# Patient Record
Sex: FEMALE | Race: WHITE | NOT HISPANIC OR LATINO | Employment: UNEMPLOYED | URBAN - METROPOLITAN AREA
[De-identification: names, ages, dates, MRNs, and addresses within clinical notes are randomized per-mention and may not be internally consistent; named-entity substitution may affect disease eponyms.]

---

## 2018-04-17 ENCOUNTER — OFFICE VISIT CONVERTED (OUTPATIENT)
Dept: FAMILY MEDICINE CLINIC | Facility: CLINIC | Age: 23
End: 2018-04-17
Attending: NURSE PRACTITIONER

## 2018-05-17 ENCOUNTER — OFFICE VISIT CONVERTED (OUTPATIENT)
Dept: FAMILY MEDICINE CLINIC | Facility: CLINIC | Age: 23
End: 2018-05-17
Attending: NURSE PRACTITIONER

## 2018-08-29 ENCOUNTER — OFFICE VISIT CONVERTED (OUTPATIENT)
Dept: GASTROENTEROLOGY | Facility: CLINIC | Age: 23
End: 2018-08-29
Attending: PHYSICIAN ASSISTANT

## 2018-10-10 ENCOUNTER — OFFICE VISIT CONVERTED (OUTPATIENT)
Dept: FAMILY MEDICINE CLINIC | Facility: CLINIC | Age: 23
End: 2018-10-10
Attending: NURSE PRACTITIONER

## 2018-10-10 ENCOUNTER — CONVERSION ENCOUNTER (OUTPATIENT)
Dept: FAMILY MEDICINE CLINIC | Facility: CLINIC | Age: 23
End: 2018-10-10

## 2018-10-19 ENCOUNTER — OFFICE VISIT CONVERTED (OUTPATIENT)
Dept: FAMILY MEDICINE CLINIC | Facility: CLINIC | Age: 23
End: 2018-10-19
Attending: NURSE PRACTITIONER

## 2019-11-02 ENCOUNTER — HOSPITAL ENCOUNTER (OUTPATIENT)
Dept: URGENT CARE | Facility: CLINIC | Age: 24
Discharge: HOME | End: 2019-11-02
Attending: EMERGENCY MEDICINE

## 2019-12-14 ENCOUNTER — HOSPITAL ENCOUNTER (OUTPATIENT)
Dept: URGENT CARE | Facility: CLINIC | Age: 24
Discharge: HOME | End: 2019-12-14
Attending: PHYSICIAN ASSISTANT

## 2020-11-30 ENCOUNTER — HOSPITAL ENCOUNTER (OUTPATIENT)
Dept: URGENT CARE | Facility: CLINIC | Age: 25
Discharge: HOME | End: 2020-11-30

## 2020-12-02 LAB — BACTERIA SPEC AEROBE CULT: NORMAL

## 2020-12-03 LAB — SARS-COV-2 RNA SPEC QL NAA+PROBE: NOT DETECTED

## 2021-01-29 ENCOUNTER — OFFICE VISIT CONVERTED (OUTPATIENT)
Dept: FAMILY MEDICINE CLINIC | Facility: CLINIC | Age: 26
End: 2021-01-29
Attending: NURSE PRACTITIONER

## 2021-01-29 ENCOUNTER — CONVERSION ENCOUNTER (OUTPATIENT)
Dept: FAMILY MEDICINE CLINIC | Facility: CLINIC | Age: 26
End: 2021-01-29

## 2021-01-29 ENCOUNTER — HOSPITAL ENCOUNTER (OUTPATIENT)
Dept: FAMILY MEDICINE CLINIC | Facility: CLINIC | Age: 26
Discharge: HOME | End: 2021-01-29
Attending: NURSE PRACTITIONER

## 2021-01-29 LAB
FOLATE SERPL-MCNC: 7.1 NG/ML (ref 4.8–20)
VIT B12 SERPL-MCNC: 458 PG/ML (ref 211–911)

## 2021-01-30 LAB
ALBUMIN SERPL-MCNC: 4 G/DL (ref 3.5–5)
ALBUMIN/GLOB SERPL: 1.2 {RATIO} (ref 1.4–2.6)
ALP SERPL-CCNC: 58 U/L (ref 42–98)
ALT SERPL-CCNC: 11 U/L (ref 10–40)
ANION GAP SERPL CALC-SCNC: 16 MMOL/L (ref 8–19)
AST SERPL-CCNC: 23 U/L (ref 15–50)
BASOPHILS # BLD AUTO: 0.04 10*3/UL (ref 0–0.2)
BASOPHILS NFR BLD AUTO: 0.6 % (ref 0–3)
BILIRUB SERPL-MCNC: <0.15 MG/DL (ref 0.2–1.3)
BUN SERPL-MCNC: 17 MG/DL (ref 5–25)
BUN/CREAT SERPL: 24 {RATIO} (ref 6–20)
CALCIUM SERPL-MCNC: 8.8 MG/DL (ref 8.7–10.4)
CHLORIDE SERPL-SCNC: 104 MMOL/L (ref 99–111)
CHOLEST SERPL-MCNC: 136 MG/DL (ref 107–200)
CHOLEST/HDLC SERPL: 3.3 {RATIO} (ref 3–6)
CONV ABS IMM GRAN: 0.02 10*3/UL (ref 0–0.2)
CONV CO2: 22 MMOL/L (ref 22–32)
CONV IMMATURE GRAN: 0.3 % (ref 0–1.8)
CONV TOTAL PROTEIN: 7.3 G/DL (ref 6.3–8.2)
CREAT UR-MCNC: 0.7 MG/DL (ref 0.5–0.9)
DEPRECATED RDW RBC AUTO: 41.1 FL (ref 36.4–46.3)
EOSINOPHIL # BLD AUTO: 0.14 10*3/UL (ref 0–0.7)
EOSINOPHIL # BLD AUTO: 2 % (ref 0–7)
ERYTHROCYTE [DISTWIDTH] IN BLOOD BY AUTOMATED COUNT: 14.9 % (ref 11.7–14.4)
GFR SERPLBLD BASED ON 1.73 SQ M-ARVRAT: >60 ML/MIN/{1.73_M2}
GLOBULIN UR ELPH-MCNC: 3.3 G/DL (ref 2–3.5)
GLUCOSE SERPL-MCNC: 77 MG/DL (ref 65–99)
HCT VFR BLD AUTO: 37.9 % (ref 37–47)
HDLC SERPL-MCNC: 41 MG/DL (ref 40–60)
HGB BLD-MCNC: 11.3 G/DL (ref 12–16)
LDLC SERPL CALC-MCNC: 84 MG/DL (ref 70–100)
LYMPHOCYTES # BLD AUTO: 1.56 10*3/UL (ref 1–5)
LYMPHOCYTES NFR BLD AUTO: 22.3 % (ref 20–45)
MCH RBC QN AUTO: 23.1 PG (ref 27–31)
MCHC RBC AUTO-ENTMCNC: 29.8 G/DL (ref 33–37)
MCV RBC AUTO: 77.3 FL (ref 81–99)
MONOCYTES # BLD AUTO: 0.49 10*3/UL (ref 0.2–1.2)
MONOCYTES NFR BLD AUTO: 7 % (ref 3–10)
NEUTROPHILS # BLD AUTO: 4.75 10*3/UL (ref 2–8)
NEUTROPHILS NFR BLD AUTO: 67.8 % (ref 30–85)
NRBC CBCN: 0 % (ref 0–0.7)
OSMOLALITY SERPL CALC.SUM OF ELEC: 286 MOSM/KG (ref 273–304)
PLATELET # BLD AUTO: 275 10*3/UL (ref 130–400)
PMV BLD AUTO: 11.8 FL (ref 9.4–12.3)
POTASSIUM SERPL-SCNC: 4.4 MMOL/L (ref 3.5–5.3)
RBC # BLD AUTO: 4.9 10*6/UL (ref 4.2–5.4)
SODIUM SERPL-SCNC: 138 MMOL/L (ref 135–147)
TRIGL SERPL-MCNC: 57 MG/DL (ref 40–150)
TSH SERPL-ACNC: 3.33 M[IU]/L (ref 0.27–4.2)
VLDLC SERPL-MCNC: 11 MG/DL (ref 5–37)
WBC # BLD AUTO: 7 10*3/UL (ref 4.8–10.8)

## 2021-03-20 ENCOUNTER — HOSPITAL ENCOUNTER (OUTPATIENT)
Dept: URGENT CARE | Facility: CLINIC | Age: 26
Discharge: HOME | End: 2021-03-20
Attending: NURSE PRACTITIONER

## 2021-04-23 ENCOUNTER — HOSPITAL ENCOUNTER (OUTPATIENT)
Dept: SLEEP MEDICINE | Facility: HOSPITAL | Age: 26
Discharge: HOME | End: 2021-04-23
Attending: INTERNAL MEDICINE

## 2021-05-07 ENCOUNTER — HOSPITAL ENCOUNTER (OUTPATIENT)
Dept: SLEEP MEDICINE | Facility: HOSPITAL | Age: 26
Discharge: HOME | End: 2021-05-07
Attending: INTERNAL MEDICINE

## 2021-05-14 VITALS
OXYGEN SATURATION: 98 % | HEART RATE: 88 BPM | DIASTOLIC BLOOD PRESSURE: 91 MMHG | BODY MASS INDEX: 48.23 KG/M2 | SYSTOLIC BLOOD PRESSURE: 149 MMHG | HEIGHT: 64 IN | WEIGHT: 282.5 LBS | TEMPERATURE: 99.3 F | RESPIRATION RATE: 20 BRPM

## 2021-05-14 NOTE — PROGRESS NOTES
"   Progress Note      Patient Name: Nikki Patel   Patient ID: [id number]   Sex: Female   YOB: 1995    Referring Provider: Yonis JOSEPH    Visit Date: January 29, 2021    Provider: OPAL Griffiths   Location: [facility]   Location Address: [address] [place] [address]  [id number]   Location Phone: [phone]          Chief Complaint  · follow up anxiety, depression, palpitations, vitamin d deficiency, and allergies       History Of Present Illness  Nikki Patel is a 25 year old /White female who presents for evaluation and treatment of:      follow up anxiety, depression, palpitations,  vitamin d deficiency, and allergies   medication refills    c/o- bilateral ear pain and has felt \"off balance\" for almost 2 weeks  states that she is constantly nasally congested, has been for 2 years and gets scabs in her nostrils.   States that she sneezes frequently  States that she snores and thinks that it makes it worse.   States that she has tried allergy medication but it doesn't help.   no allergist     States that she wants to see about getting back on mirtazapine for depression, hydroxyzine for anxiety previously [facility] , left practice  and Metoprolol fro palpitations     pap- 10/30/2018    also pt c/o fatigue, snoring, not restful sleep, waking tired       Past Medical History  Disease Name Date Onset Notes   Abdominal Pain --  --    Anemia, Unspecified --  --    Anxiety --  --    Borderline personality disorder --  --    Chest pain --  --    Depression --  --    Irregular heart beat --  --          Past Surgical History  Procedure Name Date Notes   Cholecstectomy 2017 --    Colonoscopy 2018 --          Medication List  Name Date Started Instructions   hydroxyzine HCl 10 mg oral tablet  take 1 tablet by oral route As needed   metoprolol succinate 25 mg oral tablet extended release 24 hr 10/10/2018 " "TAKE 1 TABLET BY MOUTH EVERY DAY   mirtazapine 30 mg oral tablet 08/12/2019 TAKE 1 & 1/2 (ONE & ONE-HALF) TABLETS BY MOUTH ONCE DAILY   wrist brace metal with stay 04/17/2018 bilateral wrists dx wrist pain         Allergy List  Allergen Name Date Reaction Notes   NO KNOWN DRUG ALLERGIES --  --  --          Family Medical History  Disease Name Relative/Age Notes   Family history of colon cancer  Aunt         Social History  Finding Status Start/Stop Quantity Notes   Alcohol Current some day --/-- --  drinks weekly; liquor   Caffeine Current some day --/-- --  drinks occasionally; soft drinks; 1-2 times per day   Second hand smoke exposure Current some day --/-- --  yes   Tobacco Never --/-- --  never a smoker, smokes E-CIG with 0 nicotine         Immunizations  NameDate Admin Mfg Trade Name Lot Number Route Inj VIS Given VIS Publication   Qhlvujuvk09/10/2018 [facility] Fluzone Quadrivalent [id number] IM RD 10/10/2018 08/07/2015   Comments: pt tolerated well         Review of Systems  · Constitutional  o Admits  o : fatigue  o Denies  o : fever, chills  · Eyes  o Denies  o : discharge from eye  · HENT  o Admits  o : ear pain, nasal discharge, postnasal drainage, headaches, nasal congestion  o Denies  o : sore throat  · Cardiovascular  o Denies  o : chest Pain  · Respiratory  o Denies  o : frequent cough, shortness of breath  · Gastrointestinal  o Denies  o : nausea, vomiting, changes in bowel habits  · Genitourinary  o Denies  o : dysuria  · Neurologic  o Admits  o : headache, dizziness  · Psychiatric  o Admits  o : anxiety, depression, difficulty sleeping  o Denies  o : SI/HI  · Allergic-Immunologic  o Admits  o : seasonal allergies      Vitals  Date Time BP Position Site L\R Cuff Size HR RR TEMP (F) WT  HT  BMI kg/m2 BSA m2 O2 Sat FR L/min FiO2 HC       05/17/2018 09:36 /90 Sitting    95 - R  97.9 278lbs 0oz 5'  3\" 49.24 2.37 99 %      08/29/2018 02:35 /71 Sitting    89 - R   278lbs 0oz 5'  4\" 47.72 2.39 100 %    " "  10/10/2018 02:19 /74 Sitting    99 - R 19  283lbs 0oz 5'  4\" 48.58 2.41 98 %      10/19/2018 12:17 /79 Sitting    94 - R 16  283lbs 0oz 5'  4\" 48.58 2.41 9 %      01/29/2021 10:00 /91 Sitting    88 - R 20 99.3 282lbs 8oz 5'  4\" 48.49 2.41 98 %            Physical Examination  · Constitutional  o Appearance  o : well-nourished, in no acute distress  · Eyes  o Conjunctivae  o : conjunctivae injected   o Sclerae  o : sclerae white  o Pupils and Irises  o : pupils equal and round  o Eyelids/Ocular Adnexae  o : eyelid appearance normal, no exudates present  · Ears, Nose, Mouth and Throat  o Ears  o :   § External Ears  § : external auditory canal appearance within normal limits  § Otoscopic Examination  § : tympanic membrane appearance injected bilaterally  o Nose  o :   § External Nose  § : appearance normal  § Intranasal Exam  § : mucosa inflamed, sinuses nontender to palpation  § Nasopharynx  § : clear discharge present  o Throat  o :   § Oropharynx  § : no inflammation or lesions present, tonsils within normal limits  · Neck  o Inspection/Palpation  o : normal appearance, no masses or tenderness, trachea midline  o Thyroid  o : gland size normal, nontender  · Respiratory  o Respiratory Effort  o : breathing unlabored  o Auscultation of Lungs  o : normal breath sounds throughout inspiration and expiration  · Cardiovascular  o Heart  o :   § Auscultation of Heart  § : regular rate and rhythm, no murmur  o Peripheral Vascular System  o :   § Carotid Arteries  § : no bruits present  § Extremities  § : no clubbing or edema  · Gastrointestinal  o Abdominal Examination  o : abdomen nontender to palpation, bowel sounds present   · Lymphatic  o Neck  o : no lymphadenopathy present  · Skin and Subcutaneous Tissue  o General Inspection  o : pink, warm, dry  · Neurologic  o Mental Status Examination  o :   § Orientation  § : grossly oriented to person, place and time  o Gait and Station  o : normal gait, able " to stand without difficulty  · Psychiatric  o Judgement and Insight  o : judgment and insight intact  o Mood and Affect  o : mood normal, affect appropriate              Assessment  · Screening for depression     V79.0/Z13.89  · Allergic rhinitis due to allergen     477.9/J30.9  will start Flonase and allegra  · Anxiety disorder     300.00/F41.9  will restart hydroxyzine   · Depression     311/F32.9  will restart mirtazapine, recommend counseling   · Diarrhea     787.91/R19.7  · Fatigue     780.79/R53.83  · Insomnia, unspecified     780.52/G47.00  will refer to sleep center   · Vitamin D deficiency     268.9/E55.9  · Ear pain, bilateral     388.70/H92.03  · Head congestion     478.19/R09.81  · Sneezing     784.99/R06.7  · Palpitations     785.1/R00.2  will restart metoprolol   · Snoring     786.09/R06.83      Plan  · Orders  o ACO-18: Positive screen for clinical depression using a standardized tool and a follow-up plan documented () - V79.0/Z13.89 - 01/29/2021  o ALLERGY CONSULTATION (ALLEG) - 477.9/J30.9 - 01/29/2021  o B12 Folate levels (B12FO) - 780.79/R53.83 - 01/29/2021  o Physical, Primary Care Panel (CBC, CMP, Lipid, TSH) [facility] ([id number], [id number], [id number], [id number]) - - 01/29/2021  o ACO-39: Current medications updated and reviewed (, 1159F) - - 01/29/2021  o Sleep Disorder Clinic Consultation (SLEEP) - - 01/29/2021  · Medications  o fluticasone propionate 50 mcg/actuation nasal spray,suspension   SIG: inhale 2 sprays (100 mcg) in each nostril by intranasal route once daily for 90 days   DISP: (3) Bottle with 1 refills  Prescribed on 01/29/2021     o fexofenadine 180 mg oral tablet   SIG: take 1 tablet (180 mg) by oral route once daily for 90 days   DISP: (90) Tablet with 1 refills  Prescribed on 01/29/2021     o hydroxyzine HCl 25 mg oral tablet   SIG: take 1 tablet by oral route 4 times a day   DISP: (120) Tablet with 1 refills  Prescribed on 01/29/2021     o mirtazapine 30 mg oral tablet   SIG: TAKE 1 & 1/2  (ONE & ONE-HALF) TABLETS BY MOUTH ONCE DAILY   DISP: (135) Tablet with 1 refills  Adjusted on 01/29/2021     o metoprolol succinate 25 mg oral tablet extended release 24 hr   SIG: TAKE 1 TABLET BY MOUTH EVERY DAY   DISP: (90) Tablet with 1 refills  Refilled on 01/29/2021     o Medications have been Reconciled  o Transition of Care or Provider Policy  · Instructions  o Depression Screen completed and scanned into the EMR under the designated folder within the patient's documents.  o Today's PHQ-9 result is 17  o Patient was educated/instructed on their diagnosis, treatment and medications prior to discharge from the clinic today.  · Disposition  o will contact with diagnostics results  o schedule PAP            Electronically Signed by: OPAL Griffiths -Author on January 29, 2021 10:40:33 AM

## 2021-05-16 VITALS
RESPIRATION RATE: 19 BRPM | SYSTOLIC BLOOD PRESSURE: 139 MMHG | OXYGEN SATURATION: 98 % | HEART RATE: 99 BPM | HEIGHT: 64 IN | DIASTOLIC BLOOD PRESSURE: 74 MMHG | BODY MASS INDEX: 48.32 KG/M2 | WEIGHT: 283 LBS

## 2021-05-16 VITALS
WEIGHT: 278 LBS | HEIGHT: 64 IN | HEART RATE: 89 BPM | SYSTOLIC BLOOD PRESSURE: 138 MMHG | DIASTOLIC BLOOD PRESSURE: 71 MMHG | OXYGEN SATURATION: 100 % | BODY MASS INDEX: 47.46 KG/M2

## 2021-05-16 VITALS
DIASTOLIC BLOOD PRESSURE: 85 MMHG | HEIGHT: 63 IN | HEART RATE: 92 BPM | SYSTOLIC BLOOD PRESSURE: 135 MMHG | TEMPERATURE: 98.2 F | OXYGEN SATURATION: 99 % | WEIGHT: 290 LBS | BODY MASS INDEX: 51.38 KG/M2

## 2021-05-16 VITALS
RESPIRATION RATE: 16 BRPM | DIASTOLIC BLOOD PRESSURE: 79 MMHG | HEART RATE: 94 BPM | OXYGEN SATURATION: 9 % | SYSTOLIC BLOOD PRESSURE: 138 MMHG | BODY MASS INDEX: 48.32 KG/M2 | WEIGHT: 283 LBS | HEIGHT: 64 IN

## 2021-05-16 VITALS
BODY MASS INDEX: 49.26 KG/M2 | DIASTOLIC BLOOD PRESSURE: 90 MMHG | SYSTOLIC BLOOD PRESSURE: 130 MMHG | HEIGHT: 63 IN | WEIGHT: 278 LBS | TEMPERATURE: 97.9 F | HEART RATE: 95 BPM | OXYGEN SATURATION: 99 %

## 2021-06-03 NOTE — CONSULTS
Patient: NIKKI PATEL     Acct: [id number]     Report: #[id number]  MR #:  [id number]     DOS: 2021 1349     : 1995  DICTATING: Moni Yo  ***Signed***  --------------------------------------------------------------------------------------------------------------------                              [employer]                          [place], [place]  [address]           __________________________________________________________________________         Patient Name:                   Attending Physician:    Nikki Patel M.D.         Patient Visit # MR #            Admit Date  Disch Date     Location    [id number]    [id number]      2021                 SLEEP- -         Date of Birth    1995    __________________________________________________________________________    0814 - SLEEP OFFICE VISIT         DATE OF SERVICE:  2021         REFERRING PHYSICIAN:    OPAL Mccabe         CHIEF COMPLAINT:    Always feeling tired.         HISTORY OF PRESENT ILLNESS:    This is a 26-year-old female patient with a history of depression. She is    here today for complaints about excessive daytime sleepiness, no matter how    many hours of sleep she gets.         She usually goes to bed late, around 2 to 3 a.m. It takes her about 30    minutes to fall asleep. She wakes up at 11:45 a.m. She feels tired, groggy,    and very sleepy when she wakes up. She does wake up a few times at night to    urinate and awakens herself sometimes choking and coughing.         [place] Sleepiness Scale = 15. She denies sleep paralysis, hallucinations, or    cataplexy.         She reported loud snoring which sometimes awakens her, especially when she    falls asleep initially. Her fiancee complains about her loud snoring. She    wakes up with headache often and wakes up with dry  mouth and reported leg    jerks and very restless sleep. She was told that she talks in her sleep and    constantly flip flops throughout the night.         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         REVIEW OF SYSTEMS:    CONSTITUTIONAL: Fatigue. No change in appetite.    EMNT: She reported recurrent nasal bleeding and pain in her mouth and nasal    congestion.    CARDIOVASCULAR: She reported irregular heartbeats but denies chest pain or    swollen legs.    RESPIRATORY: She denies shortness of breath, cough, or wheezing.    GASTROINTESTINAL: She denies problems swallowing, heartburn, or diarrhea.    MUSCULOSKELETAL: She denies pain in her joints, stiffness or redness in her    joints.    ENDOCRINE: She denies cold or warm intolerance but reported excessive thirst.    URINARY: She denies dysuria or frequent urination.    NEURO/PSYCH: She reported dizziness, headache, anxiety and depression.    LYMPHATICS: She reported easy bruising but denies enlarged lymph nodes.    SKIN: She denies rash.         PAST MEDICAL HISTORY:    1.  Depression.    2.  Irregular heartbeats.         MEDICATIONS:    1.  Mirtazapine.    2.  Metoprolol.    3.  Fluticasone.    4.  Hydroxyzine.         PAST SURGICAL HISTORY:    Cholecystectomy in 2017.         ALLERGIES:    She has allergy to caviar.         SOCIAL HISTORY:    She vapes intermittently. She does not smoke. She drinks alcohol about 3    times a week. She rarely drinks caffeinated beverages such as tea. She denies    current illicit drug use.         FAMILY HISTORY:    Negative for sleep apnea.         PHYSICAL EXAMINATION:    VITAL SIGNS: Blood pressure 141/97, heart rate is 91, SpO2 97%, BMI 49,    weight is 287 pounds, height 5 feet 4 inches.    CONSTITUTIONAL: Not in any acute distress.    EYES: Injected conjunctivae. Extraocular  movements are intact. Pupils are    equal and reactive to light.    EMNT: Freidman's and Mallampati score of II and narrow distance in-between    the posterior pharyngeal pillars estimated to be less than 50% of the base of    the tongue. Tonsils are grade 2.    LUNGS: Clear to auscultation bilaterally. No crackles or wheezing. Nonlabored    breathing.    HEART: Regular rhythm and rate, no audible murmur.    ABDOMEN: Obese, soft, no tenderness. Positive bowel sounds.    musculoskeletal: No cyanosis, clubbing, or edema. Warm extremities, well    perfused.    NEURO/PSYCH: She is conscious, alert, and oriented. She has appropriate mood    and affect. Gait is normal.    INTEGUMENTARY: No skin rash or ecchymosis.    LYMPHATICS: No palpable cervical or supraclavicular lymph nodes.         ASSESSMENT:    1.  Snoring, probable obstructive sleep apnea.    2.  Hypersomnia, unspecified, likely secondary to sleep apnea.    3.  Morbid obesity (BMI = 49).    4.  Depression.         RECOMMENDATIONS:    1.  Check home sleep test. I explained the pathophysiology of sleep apnea        with the patient, testing, and therapy which may need CPAP and weight        loss.    2.  The patient is agreeable to CPAP therapy if needed.    3.  I counseled the patient for weight loss and encouraged her to exercise        regularly and cut down on carbohydrates. I explained that losing weight        may decrease the severity of sleep apnea and obviate the need of CPAP        therapy.    4.  I discussed with the patient the association between obstruction sleep        apnea and various comorbidities including most disorders such as        depression and the beneficial effect of sleep apnea therapy in reducing        the risk of cardiovascular disease and swings in mood.         To be electronically signed in [employer]    0988 TOMMY OCASIO M.D.         RJ:roberto    D:  04/23/2021 15:18    T:  04/27/2021 13:28    #[id number]         CC: SLEEP LAB          Until signed, this is an unconfirmed preliminary report that may contain    errors and is subject to change.         Electronically signed by Moni Yo  05/07/2021 08:43     Disclaimer: Converted hospital document may not contain table formatting or lab diagrams. Please see [employer] for authenticated document.

## 2021-12-14 ENCOUNTER — OFFICE VISIT (OUTPATIENT)
Dept: FAMILY MEDICINE CLINIC | Facility: CLINIC | Age: 26
End: 2021-12-14

## 2021-12-14 VITALS
HEIGHT: 64 IN | BODY MASS INDEX: 48.83 KG/M2 | HEART RATE: 100 BPM | TEMPERATURE: 98.5 F | SYSTOLIC BLOOD PRESSURE: 130 MMHG | OXYGEN SATURATION: 98 % | DIASTOLIC BLOOD PRESSURE: 86 MMHG | WEIGHT: 286 LBS

## 2021-12-14 DIAGNOSIS — Z13.29 SCREENING FOR THYROID DISORDER: ICD-10-CM

## 2021-12-14 DIAGNOSIS — D64.9 ANEMIA, UNSPECIFIED TYPE: ICD-10-CM

## 2021-12-14 DIAGNOSIS — R35.0 FREQUENT URINATION: ICD-10-CM

## 2021-12-14 DIAGNOSIS — R11.0 NAUSEA: ICD-10-CM

## 2021-12-14 DIAGNOSIS — Z13.220 SCREENING, LIPID: ICD-10-CM

## 2021-12-14 DIAGNOSIS — R73.09 ELEVATED GLUCOSE: ICD-10-CM

## 2021-12-14 DIAGNOSIS — Z3A.01 LESS THAN 8 WEEKS GESTATION OF PREGNANCY: Primary | ICD-10-CM

## 2021-12-14 PROBLEM — F32.A DEPRESSION: Status: ACTIVE | Noted: 2021-12-14

## 2021-12-14 PROBLEM — I49.9 IRREGULAR HEART BEAT: Status: ACTIVE | Noted: 2021-12-14

## 2021-12-14 PROBLEM — F41.9 ANXIETY: Status: ACTIVE | Noted: 2021-12-14

## 2021-12-14 PROBLEM — F60.3 BORDERLINE PERSONALITY DISORDER: Status: ACTIVE | Noted: 2021-12-14

## 2021-12-14 LAB
ACANTHOCYTES BLD QL SMEAR: ABNORMAL
ALBUMIN SERPL-MCNC: 4.5 G/DL (ref 3.5–5.2)
ALBUMIN/GLOB SERPL: 1.2 G/DL
ALP SERPL-CCNC: 53 U/L (ref 39–117)
ALT SERPL W P-5'-P-CCNC: 12 U/L (ref 1–33)
ANION GAP SERPL CALCULATED.3IONS-SCNC: 12.4 MMOL/L (ref 5–15)
ANISOCYTOSIS BLD QL: ABNORMAL
AST SERPL-CCNC: 16 U/L (ref 1–32)
BILIRUB SERPL-MCNC: 0.2 MG/DL (ref 0–1.2)
BUN SERPL-MCNC: 8 MG/DL (ref 6–20)
BUN/CREAT SERPL: 13.6 (ref 7–25)
CALCIUM SPEC-SCNC: 9.9 MG/DL (ref 8.6–10.5)
CHLORIDE SERPL-SCNC: 102 MMOL/L (ref 98–107)
CHOLEST SERPL-MCNC: 143 MG/DL (ref 0–200)
CO2 SERPL-SCNC: 21.6 MMOL/L (ref 22–29)
CREAT SERPL-MCNC: 0.59 MG/DL (ref 0.57–1)
DEPRECATED RDW RBC AUTO: 40.7 FL (ref 37–54)
EOSINOPHIL # BLD MANUAL: 0.08 10*3/MM3 (ref 0–0.4)
EOSINOPHIL NFR BLD MANUAL: 1 % (ref 0.3–6.2)
ERYTHROCYTE [DISTWIDTH] IN BLOOD BY AUTOMATED COUNT: 15.6 % (ref 12.3–15.4)
FERRITIN SERPL-MCNC: 28.9 NG/ML (ref 13–150)
GFR SERPL CREATININE-BSD FRML MDRD: 124 ML/MIN/1.73
GLOBULIN UR ELPH-MCNC: 3.7 GM/DL
GLUCOSE SERPL-MCNC: 105 MG/DL (ref 65–99)
HCT VFR BLD AUTO: 38.5 % (ref 34–46.6)
HDLC SERPL-MCNC: 36 MG/DL (ref 40–60)
HGB BLD-MCNC: 12.4 G/DL (ref 12–15.9)
IRON 24H UR-MRATE: 22 MCG/DL (ref 37–145)
IRON SATN MFR SERPL: 5 % (ref 20–50)
LDLC SERPL CALC-MCNC: 90 MG/DL (ref 0–100)
LDLC/HDLC SERPL: 2.49 {RATIO}
LYMPHOCYTES # BLD MANUAL: 1.37 10*3/MM3 (ref 0.7–3.1)
LYMPHOCYTES NFR BLD MANUAL: 5.2 % (ref 5–12)
MCH RBC QN AUTO: 23.6 PG (ref 26.6–33)
MCHC RBC AUTO-ENTMCNC: 32.2 G/DL (ref 31.5–35.7)
MCV RBC AUTO: 73.3 FL (ref 79–97)
MICROCYTES BLD QL: ABNORMAL
MONOCYTES # BLD: 0.43 10*3/MM3 (ref 0.1–0.9)
NEUTROPHILS # BLD AUTO: 6.42 10*3/MM3 (ref 1.7–7)
NEUTROPHILS NFR BLD MANUAL: 77.3 % (ref 42.7–76)
NRBC BLD AUTO-RTO: 0 /100 WBC (ref 0–0.2)
PLAT MORPH BLD: NORMAL
PLATELET # BLD AUTO: 292 10*3/MM3 (ref 140–450)
PMV BLD AUTO: 11.4 FL (ref 6–12)
POIKILOCYTOSIS BLD QL SMEAR: ABNORMAL
POLYCHROMASIA BLD QL SMEAR: ABNORMAL
POTASSIUM SERPL-SCNC: 3.9 MMOL/L (ref 3.5–5.2)
PROT SERPL-MCNC: 8.2 G/DL (ref 6–8.5)
RBC # BLD AUTO: 5.25 10*6/MM3 (ref 3.77–5.28)
SODIUM SERPL-SCNC: 136 MMOL/L (ref 136–145)
TIBC SERPL-MCNC: 453 MCG/DL (ref 298–536)
TRANSFERRIN SERPL-MCNC: 304 MG/DL (ref 200–360)
TRIGL SERPL-MCNC: 87 MG/DL (ref 0–150)
TSH SERPL DL<=0.05 MIU/L-ACNC: 1.86 UIU/ML (ref 0.27–4.2)
VARIANT LYMPHS NFR BLD MANUAL: 16.5 % (ref 19.6–45.3)
VLDLC SERPL-MCNC: 17 MG/DL (ref 5–40)
WBC MORPH BLD: NORMAL
WBC NRBC COR # BLD: 8.3 10*3/MM3 (ref 3.4–10.8)

## 2021-12-14 PROCEDURE — 99213 OFFICE O/P EST LOW 20 MIN: CPT | Performed by: NURSE PRACTITIONER

## 2021-12-14 PROCEDURE — 83036 HEMOGLOBIN GLYCOSYLATED A1C: CPT | Performed by: NURSE PRACTITIONER

## 2021-12-14 PROCEDURE — 81003 URINALYSIS AUTO W/O SCOPE: CPT | Performed by: NURSE PRACTITIONER

## 2021-12-14 PROCEDURE — 82728 ASSAY OF FERRITIN: CPT | Performed by: NURSE PRACTITIONER

## 2021-12-14 PROCEDURE — 85025 COMPLETE CBC W/AUTO DIFF WBC: CPT | Performed by: NURSE PRACTITIONER

## 2021-12-14 PROCEDURE — 83540 ASSAY OF IRON: CPT | Performed by: NURSE PRACTITIONER

## 2021-12-14 PROCEDURE — 80053 COMPREHEN METABOLIC PANEL: CPT | Performed by: NURSE PRACTITIONER

## 2021-12-14 PROCEDURE — 84702 CHORIONIC GONADOTROPIN TEST: CPT | Performed by: NURSE PRACTITIONER

## 2021-12-14 PROCEDURE — 80061 LIPID PANEL: CPT | Performed by: NURSE PRACTITIONER

## 2021-12-14 PROCEDURE — 84466 ASSAY OF TRANSFERRIN: CPT | Performed by: NURSE PRACTITIONER

## 2021-12-14 PROCEDURE — 84443 ASSAY THYROID STIM HORMONE: CPT | Performed by: NURSE PRACTITIONER

## 2021-12-14 PROCEDURE — 85007 BL SMEAR W/DIFF WBC COUNT: CPT | Performed by: NURSE PRACTITIONER

## 2021-12-14 RX ORDER — DOXYLAMINE SUCCINATE AND PYRIDOXINE HYDROCHLORIDE, DELAYED RELEASE TABLETS 10 MG/10 MG 10; 10 MG/1; MG/1
2 TABLET, DELAYED RELEASE ORAL NIGHTLY PRN
Qty: 60 TABLET | Refills: 2 | Status: SHIPPED | OUTPATIENT
Start: 2021-12-14 | End: 2022-01-06

## 2021-12-14 NOTE — PROGRESS NOTES
Follow Up Office Visit      Patient Name: Nikki Patel  : 1995   MRN: [id number]     Chief Complaint:    Chief Complaint   Patient presents with   • Hyperemesis Gravidarum   • Allergic Rhinitis   • Anemia       History of Present Illness: Nikki Patel is a 26 y.o. female who is here today for nausea  Follow up anemia, seasonal allergies, sleep apnea  Patient reports she was diagnosed with sleep apnea earlier this year but lost her insurance has not obtain CPAP machine at this time  Patient reports she has had some sinus drainage but using OTC Benadryl  C/O-pt is 7 weeks pregnant per patient's calculations  LMP 11.    [facility]  Subjective      Review of Systems:   Review of Systems   Constitutional: Negative for chills and fever.   Respiratory: Negative for cough.    Cardiovascular: Negative for chest pain.   Gastrointestinal: Positive for nausea. Negative for abdominal pain, constipation, diarrhea and vomiting.   Genitourinary: Positive for urgency. Negative for dysuria.   Allergic/Immunologic: Positive for environmental allergies.   Neurological: Negative for headaches.        Past Medical History: History reviewed. No pertinent past medical history.    Past Surgical History: No past surgical history on file.    Family History: No family history on file.    Social History:   Social History     Socioeconomic History   • Marital status: Single   Tobacco Use   • Smoking status: Current Every Day Smoker     Packs/day: 0.10     Types: Cigarettes   • Smokeless tobacco: Never Used       Medications:     Current Outpatient Medications:   •  doxylamine-pyridoxine (Diclegis) 10-10 MG tablet delayed-release EC tablet, Take 2 tablets by mouth At Night As Needed (nausea)., Disp: 60 tablet, Rfl: 2    Allergies:   Not on File      Objective     Physical Exam:  Vital Signs:   Vitals:    21 1433   BP: 130/86   Pulse: 100   Temp: 98.5 °F (36.9 °C)   SpO2: 98%  "  Weight: 130 kg (286 lb)   Height: 162.6 cm (64\")     Body mass index is 49.09 kg/m².     Physical Exam  HENT:      Right Ear: Tympanic membrane normal.      Left Ear: Tympanic membrane normal.      Nose: Nose normal.      Mouth/Throat:      Mouth: Mucous membranes are moist.   Eyes:      Conjunctiva/sclera: Conjunctivae normal.      Pupils: Pupils are equal, round, and reactive to light.   Neck:      Vascular: No carotid bruit.   Cardiovascular:      Rate and Rhythm: Normal rate and regular rhythm.      Heart sounds: Normal heart sounds. No murmur heard.      Pulmonary:      Effort: Pulmonary effort is normal.      Breath sounds: Normal breath sounds.   Abdominal:      General: Bowel sounds are normal.      Palpations: Abdomen is soft.   Musculoskeletal:      Right lower leg: No edema.      Left lower leg: No edema.   Skin:     General: Skin is warm and dry.   Neurological:      Mental Status: She is alert.   Psychiatric:         Mood and Affect: Mood normal.         Behavior: Behavior normal.             Assessment / Plan      Assessment/Plan:   Diagnoses and all orders for this visit:    1. Less than 8 weeks gestation of pregnancy (Primary)  -     hCG, Quantitative, Pregnancy; Future  -     hCG, Quantitative, Pregnancy    2. Nausea  -     Comprehensive Metabolic Panel    3. Anemia, unspecified type  -     Iron Profile  -     Ferritin  -     CBC Auto Differential    4. Screening, lipid  -     Lipid Panel    5. Screening for thyroid disorder  -     TSH    6. Frequent urination  -     Urinalysis With Culture If Indicated - Urine, Clean Catch    Other orders  -     doxylamine-pyridoxine (Diclegis) 10-10 MG tablet delayed-release EC tablet; Take 2 tablets by mouth At Night As Needed (nausea).  Dispense: 60 tablet; Refill: 2       Pregnancy positive for urine test at home will obtain hCG in office  Nausea will provide a prescription for Diclegis notify her OB at next appointment  Anemia will obtain iron profile and " CBC to monitor  Will obtain TSH screening for thyroid disorder  We will obtain lipid panel to screen for lipid disorder  Frequent urination will obtain urine analysis culture if needed        Follow Up:   Return if symptoms worsen or fail to improve.    Jaylon Connors, OPAL      Please note that portions of this note were completed with a voice recognition program.

## 2021-12-15 ENCOUNTER — TELEPHONE (OUTPATIENT)
Dept: FAMILY MEDICINE CLINIC | Facility: CLINIC | Age: 26
End: 2021-12-15

## 2021-12-15 DIAGNOSIS — R73.09 ELEVATED GLUCOSE: Primary | ICD-10-CM

## 2021-12-15 LAB
BILIRUB UR QL STRIP: NEGATIVE
CLARITY UR: CLEAR
COLOR UR: YELLOW
GLUCOSE UR STRIP-MCNC: NEGATIVE MG/DL
HBA1C MFR BLD: 5.35 % (ref 4.8–5.6)
HCG INTACT+B SERPL-ACNC: NORMAL MIU/ML
HGB UR QL STRIP.AUTO: NEGATIVE
KETONES UR QL STRIP: NEGATIVE
LEUKOCYTE ESTERASE UR QL STRIP.AUTO: NEGATIVE
NITRITE UR QL STRIP: NEGATIVE
PH UR STRIP.AUTO: 6 [PH] (ref 5–8)
PROT UR QL STRIP: NEGATIVE
SP GR UR STRIP: 1.01 (ref 1–1.03)
UROBILINOGEN UR QL STRIP: NORMAL

## 2021-12-15 NOTE — TELEPHONE ENCOUNTER
Caller: Nikki Patel    Relationship: Self    Best call back number: [phone]    Caller requesting test results: PATIENT    What test was performed: LABS AND URINE TEST    When was the test performed: 12/14/21    Where was the test performed: IN OFFICE    Additional notes: PATIENT WOULD LIKE TO GO OVER RESULTS OF LABS AND URINE TEST.

## 2021-12-15 NOTE — TELEPHONE ENCOUNTER
Caller: Nikki Patel    Relationship: Self    Best call back number: [phone]    What test/procedure requested: PRIOR AUTHORIZATION    When is it needed: ASAP    Additional information or concerns: THE NAUSEA MEDICATION THAT SHE WAS PRESCRIBED YESTERDAY REQUIRES A PRIOR AUTHORIZATION. SHE IS NOT SURE WHAT THE NAME OF IT IS.    [address] [phone] [facility] [phone] FX

## 2021-12-15 NOTE — TELEPHONE ENCOUNTER
Patient is going to call the pharmacy and ask how much the medication costs over the counter. And if not she'll contact ob/gyn.

## 2021-12-20 ENCOUNTER — APPOINTMENT (OUTPATIENT)
Dept: ULTRASOUND IMAGING | Facility: HOSPITAL | Age: 26
End: 2021-12-20

## 2021-12-20 ENCOUNTER — HOSPITAL ENCOUNTER (EMERGENCY)
Facility: HOSPITAL | Age: 26
Discharge: HOME | End: 2021-12-20
Attending: EMERGENCY MEDICINE | Admitting: EMERGENCY MEDICINE

## 2021-12-20 VITALS
BODY MASS INDEX: 45.84 KG/M2 | RESPIRATION RATE: 17 BRPM | TEMPERATURE: 97.6 F | WEIGHT: 268.52 LBS | OXYGEN SATURATION: 97 % | SYSTOLIC BLOOD PRESSURE: 131 MMHG | HEART RATE: 101 BPM | DIASTOLIC BLOOD PRESSURE: 76 MMHG | HEIGHT: 64 IN

## 2021-12-20 DIAGNOSIS — O20.0 THREATENED MISCARRIAGE: Primary | ICD-10-CM

## 2021-12-20 DIAGNOSIS — B96.89 BACTERIAL VAGINITIS: ICD-10-CM

## 2021-12-20 DIAGNOSIS — N76.0 BACTERIAL VAGINITIS: ICD-10-CM

## 2021-12-20 LAB
ABO GROUP BLD: NORMAL
BASOPHILS # BLD AUTO: 0.03 10*3/MM3 (ref 0–0.2)
BASOPHILS NFR BLD AUTO: 0.4 % (ref 0–1.5)
C TRACH RRNA CVX QL NAA+PROBE: NOT DETECTED
CANDIDA SPECIES: NEGATIVE
DEPRECATED RDW RBC AUTO: 43.1 FL (ref 37–54)
EOSINOPHIL # BLD AUTO: 0.17 10*3/MM3 (ref 0–0.4)
EOSINOPHIL NFR BLD AUTO: 2.1 % (ref 0.3–6.2)
ERYTHROCYTE [DISTWIDTH] IN BLOOD BY AUTOMATED COUNT: 16.1 % (ref 12.3–15.4)
GARDNERELLA VAGINALIS: POSITIVE
HCG INTACT+B SERPL-ACNC: NORMAL MIU/ML
HCT VFR BLD AUTO: 38.5 % (ref 34–46.6)
HGB BLD-MCNC: 12.5 G/DL (ref 12–15.9)
HOLD SPECIMEN: NORMAL
HOLD SPECIMEN: NORMAL
IMM GRANULOCYTES # BLD AUTO: 0.02 10*3/MM3 (ref 0–0.05)
IMM GRANULOCYTES NFR BLD AUTO: 0.2 % (ref 0–0.5)
LYMPHOCYTES # BLD AUTO: 2.26 10*3/MM3 (ref 0.7–3.1)
LYMPHOCYTES NFR BLD AUTO: 27.5 % (ref 19.6–45.3)
MCH RBC QN AUTO: 24 PG (ref 26.6–33)
MCHC RBC AUTO-ENTMCNC: 32.5 G/DL (ref 31.5–35.7)
MCV RBC AUTO: 74 FL (ref 79–97)
MONOCYTES # BLD AUTO: 0.38 10*3/MM3 (ref 0.1–0.9)
MONOCYTES NFR BLD AUTO: 4.6 % (ref 5–12)
N GONORRHOEA RRNA SPEC QL NAA+PROBE: NOT DETECTED
NEUTROPHILS NFR BLD AUTO: 5.36 10*3/MM3 (ref 1.7–7)
NEUTROPHILS NFR BLD AUTO: 65.2 % (ref 42.7–76)
NRBC BLD AUTO-RTO: 0 /100 WBC (ref 0–0.2)
PLATELET # BLD AUTO: 265 10*3/MM3 (ref 140–450)
PMV BLD AUTO: 11.1 FL (ref 6–12)
RBC # BLD AUTO: 5.2 10*6/MM3 (ref 3.77–5.28)
RH BLD: POSITIVE
T VAGINALIS DNA VAG QL PROBE+SIG AMP: NEGATIVE
WBC NRBC COR # BLD: 8.22 10*3/MM3 (ref 3.4–10.8)
WHOLE BLOOD HOLD SPECIMEN: NORMAL
WHOLE BLOOD HOLD SPECIMEN: NORMAL

## 2021-12-20 PROCEDURE — 87491 CHLMYD TRACH DNA AMP PROBE: CPT | Performed by: PHYSICIAN ASSISTANT

## 2021-12-20 PROCEDURE — 87480 CANDIDA DNA DIR PROBE: CPT | Performed by: PHYSICIAN ASSISTANT

## 2021-12-20 PROCEDURE — 86901 BLOOD TYPING SEROLOGIC RH(D): CPT | Performed by: PHYSICIAN ASSISTANT

## 2021-12-20 PROCEDURE — 87660 TRICHOMONAS VAGIN DIR PROBE: CPT | Performed by: PHYSICIAN ASSISTANT

## 2021-12-20 PROCEDURE — 76817 TRANSVAGINAL US OBSTETRIC: CPT

## 2021-12-20 PROCEDURE — 85025 COMPLETE CBC W/AUTO DIFF WBC: CPT | Performed by: EMERGENCY MEDICINE

## 2021-12-20 PROCEDURE — 99284 EMERGENCY DEPT VISIT MOD MDM: CPT

## 2021-12-20 PROCEDURE — 84702 CHORIONIC GONADOTROPIN TEST: CPT | Performed by: EMERGENCY MEDICINE

## 2021-12-20 PROCEDURE — 87510 GARDNER VAG DNA DIR PROBE: CPT | Performed by: PHYSICIAN ASSISTANT

## 2021-12-20 PROCEDURE — 87591 N.GONORRHOEAE DNA AMP PROB: CPT | Performed by: PHYSICIAN ASSISTANT

## 2021-12-20 PROCEDURE — 86900 BLOOD TYPING SEROLOGIC ABO: CPT | Performed by: PHYSICIAN ASSISTANT

## 2021-12-20 RX ORDER — SODIUM CHLORIDE 0.9 % (FLUSH) 0.9 %
10 SYRINGE (ML) INJECTION AS NEEDED
Status: DISCONTINUED | OUTPATIENT
Start: 2021-12-20 | End: 2021-12-20 | Stop reason: HOSPADM

## 2021-12-20 RX ORDER — METRONIDAZOLE 500 MG/1
500 TABLET ORAL 2 TIMES DAILY
Qty: 14 TABLET | Refills: 0 | Status: SHIPPED | OUTPATIENT
Start: 2021-12-20 | End: 2022-01-06

## 2021-12-20 NOTE — ED PROVIDER NOTES
Subjective   Pt states she is 8 weeks pregnant. Has not seen OB yet, apt tomorrow at [facility].  Started to have spotting yesterday and has progressed and now heavier bleeding. No clots. This is her 1st pregnancy.       History provided by:  Patient  Vaginal Bleeding - Pregnant  Quality:  Bright red  Severity:  Moderate  Onset quality:  Gradual  Duration:  1 day  Timing:  Intermittent  Progression:  Worsening  Chronicity:  New  Prior pregnancy: no    Pregnancy confirmed by ultrasound: no    Prenatal care:  Regular prenatal care  Associated symptoms: no fatigue and no fever        Review of Systems   Constitutional: Negative for appetite change, chills, fatigue and fever.   HENT: Negative.    Eyes: Negative.  Negative for photophobia.   Respiratory: Negative.    Gastrointestinal: Negative.    Endocrine: Negative.    Genitourinary: Positive for vaginal bleeding.   Musculoskeletal: Negative.    Skin: Negative.    Allergic/Immunologic: Negative.  Negative for immunocompromised state.   Neurological: Negative.    Hematological: Negative.    Psychiatric/Behavioral: Negative.    All other systems reviewed and are negative.      History reviewed. No pertinent past medical history.    No Known Allergies    History reviewed. No pertinent surgical history.    History reviewed. No pertinent family history.    Social History     Socioeconomic History   • Marital status: Single   Tobacco Use   • Smoking status: Current Every Day Smoker     Packs/day: 0.10     Types: Cigarettes   • Smokeless tobacco: Never Used           Objective   Physical Exam  Vitals and nursing note reviewed. Exam conducted with a chaperone present.   Constitutional:       General: She is not in acute distress.     Appearance: Normal appearance. She is normal weight. She is not ill-appearing or toxic-appearing.   HENT:      Head: Normocephalic and atraumatic.   Cardiovascular:      Rate and Rhythm: Normal rate and regular rhythm.      Heart sounds: Normal heart  sounds.   Pulmonary:      Effort: Pulmonary effort is normal.      Breath sounds: Normal breath sounds.   Abdominal:      General: Abdomen is flat. Bowel sounds are normal.      Palpations: Abdomen is soft.   Genitourinary:     Vagina: Normal.      Cervix: Cervical bleeding (mild bright red) present.      Adnexa: Right adnexa normal and left adnexa normal.        Right: No mass, tenderness or fullness.          Left: No mass, tenderness or fullness.     Musculoskeletal:         General: Normal range of motion.      Cervical back: Normal range of motion.   Neurological:      Mental Status: She is alert and oriented to person, place, and time. Mental status is at baseline.   Psychiatric:         Mood and Affect: Mood normal.         Behavior: Behavior normal.         Thought Content: Thought content normal.         Judgment: Judgment normal.             MDM  Number of Diagnoses or Management Options  Bacterial vaginitis  Threatened miscarriage  Diagnosis management comments: Pt is a 26 y.o. female that presents today with Patient presents with:  Vaginal Bleeding - Pregnant: approx 8 weeks       Work up today:  Lab Results (last 24 hours)     Procedure Component Value Units Date/Time    CBC & Differential ([id number])  (Abnormal) Collected: 12/20/21 0822    Specimen: Blood Updated: 12/20/21 0911    Narrative:      The following orders were created for panel order CBC & Differential.  Procedure                               Abnormality         Status                       ---------                               -----------         ------                       CBC Auto Differential([id number])        Abnormal            Final result                   Please view results for these tests on the individual orders.    hCG, Quantitative, Pregnancy ([id number]) Collected: 12/20/21 0822    Specimen: Blood Updated: 12/20/21 0905     HCG Quantitative 38,966.00 mIU/mL     Narrative:      HCG Ranges by Gestational Age    Females -  non-pregnant premenopausal   </= 1mIU/mL HCG  Females - postmenopausal               </= 7mIU/mL HCG    3 Weeks       5.4   -      72 mIU/mL  4 Weeks      10.2   -     708 mIU/mL  5 Weeks       217   -   8,245 mIU/mL  6 Weeks       152   -  32,177 mIU/mL  7 Weeks     4,059   - 153,767 mIU/mL  8 Weeks    31,366   - 149,094 mIU/mL  9 Weeks    59,109   - 135,901 mIU/mL  10 Weeks   44,186   - 170,409 mIU/mL  12 Weeks   27,107   - 201,615 mIU/mL  14 Weeks   24,302   -  93,646 mIU/mL  15 Weeks   12,540   -  69,747 mIU/mL  16 Weeks    8,904   -  55,332 mIU/mL  17 Weeks    8,240   -  51,793 mIU/mL  18 Weeks    9,649   -  55,271 mIU/mL    Results may be falsely decreased if patient taking Biotin.      CBC Auto Differential ([id number])  (Abnormal) Collected: 12/20/21 0822    Specimen: Blood Updated: 12/20/21 0911     WBC 8.22 10*3/mm3      RBC 5.20 10*6/mm3      Hemoglobin 12.5 g/dL      Hematocrit 38.5 %      MCV 74.0 fL      MCH 24.0 pg      MCHC 32.5 g/dL      RDW 16.1 %      RDW-SD 43.1 fl      MPV 11.1 fL      Platelets 265 10*3/mm3      Neutrophil % 65.2 %      Lymphocyte % 27.5 %      Monocyte % 4.6 %      Eosinophil % 2.1 %      Basophil % 0.4 %      Immature Grans % 0.2 %      Neutrophils, Absolute 5.36 10*3/mm3      Lymphocytes, Absolute 2.26 10*3/mm3      Monocytes, Absolute 0.38 10*3/mm3      Eosinophils, Absolute 0.17 10*3/mm3      Basophils, Absolute 0.03 10*3/mm3      Immature Grans, Absolute 0.02 10*3/mm3      nRBC 0.0 /100 WBC     Chlamydia trachomatis, Neisseria gonorrhoeae, PCR - Swab, Cervix   ([id number])  (Normal) Collected: 12/20/21 0853    Specimen: Swab from Cervix Updated: 12/20/21 1043     Chlamydia DNA by PCR Not Detected     Neisseria gonorrhoeae by PCR Not Detected    Gardnerella vaginalis, Trichomonas vaginalis, Candida albicans, DNA -   Swab, Vagina ([id number])  (Abnormal) Collected: 12/20/21 0854    Specimen: Swab from Vagina Updated: 12/20/21 0957     GARDNERELLA VAGINALIS Positive      TRICHOMONAS VAGINALIS Negative     CANDIDA SPECIES Negative      US Ob Transvaginal    Result Date: 12/20/2021  PROCEDURE: US OB TRANSVAGINAL  COMPARISON: None  INDICATIONS: bleeding  TECHNIQUE: Ultrasound examination of the pelvis was performed, using endovaginal technique.   FINDINGS:  The uterus measures 9.4 x 5.2 x 5.2 cm and contains a gestational sac within the endometrial cavity with mean sac diameter 19.0 mm.  There is a yolk sac measuring 3.2 mm.  There is a fetal pole with crown-rump length 13.5 mm.  The fetal heart rate is 167 beats per minute.  A nabothian cyst is noted within the cervix.  There is also a 1.7 cm subchorionic hemorrhage.  The right ovary was not well visualized.  The left ovary measures 3.3 x 2.5 x 2.3 cm and contains a 1.7 cm cyst.  Normal flow is seen within the left ovary.  CONCLUSION:  1. Single live intrauterine gestation.  Ultrasound gestational age corresponds to 7 weeks 2 days, for expected date of delivery 8/6/2022. 2. 1.7 cm subchorionic hemorrhage. 3. 1.7 cm left adnexal cyst, which could be functional or physiologic.     CELESTE TAYLOR MD       Electronically Signed and Approved By: CELESTE TAYLOR MD on 12/20/2021 at 10:47              @No orders to display       The patient will pursue further outpatient evaluation with the primary care physician or other designated or consulting physician as outlined in the discharge instructions. They are agreeable to this plan of care and follow-up instructions have been explained in detail. The patient has received these instructions in written format and have expressed an understanding of the discharge instructions. The patient is aware that any significant change in condition or worsening of symptoms should prompt an immediate return to this or the closest emergency department or call to 911.  Pt is otherwise non toxic and non ill appearing and stable for d/c home.  Pt is in agreement with this.  All questions answered at bedside.           Amount and/or Complexity of Data Reviewed  Clinical lab tests: reviewed  Tests in the radiology section of CPT®: reviewed    Risk of Complications, Morbidity, and/or Mortality  Presenting problems: moderate  Diagnostic procedures: moderate  Management options: moderate    Patient Progress  Patient progress: stable      Final diagnoses:   Threatened miscarriage   Bacterial vaginitis       ED Disposition  ED Disposition     ED Disposition Condition Comment    Discharge Stable           keep OB apt for tomorrow               Medication List      New Prescriptions    metroNIDAZOLE 500 MG tablet  Commonly known as: FLAGYL  Take 1 tablet by mouth 2 (Two) Times a Day.           Where to Get Your Medications      These medications were sent to [facility] [address] [place], [place] [address] [facility]  [phone] [facility] [phone] [address] [place] [address]    Phone: [phone]   · metroNIDAZOLE 500 MG tablet          Gallo Kelley PA  12/20/21 8871

## 2021-12-20 NOTE — ED NOTES
Patient states that she had pink show yesterday, scant amount when wiping after urination, Patient states that today she went to the bathroom this AM and had small amount of blood on toilet paper and small amount of blood in toilet. Patient states that she has abdominal cramping intermittent, patient states that she also has back pain, patient states that nothing helps relieve the back pain unless she's laying down, patient states that the pain is 8/10 with movement. Patient denies pain with intercourse or any predisposition before the bleeding occurred, Patient states her first OB appointment is tomorrow.      Marya Cummings, RN  12/20/21 1259

## 2022-01-06 PROCEDURE — U0004 COV-19 TEST NON-CDC HGH THRU: HCPCS | Performed by: NURSE PRACTITIONER

## 2022-02-01 PROCEDURE — 87086 URINE CULTURE/COLONY COUNT: CPT | Performed by: FAMILY MEDICINE

## 2022-02-02 ENCOUNTER — APPOINTMENT (OUTPATIENT)
Dept: MRI IMAGING | Facility: HOSPITAL | Age: 27
End: 2022-02-02

## 2022-02-02 ENCOUNTER — HOSPITAL ENCOUNTER (EMERGENCY)
Facility: HOSPITAL | Age: 27
Discharge: HOME | End: 2022-02-02
Attending: EMERGENCY MEDICINE | Admitting: EMERGENCY MEDICINE

## 2022-02-02 VITALS
OXYGEN SATURATION: 100 % | HEIGHT: 64 IN | BODY MASS INDEX: 45.13 KG/M2 | RESPIRATION RATE: 16 BRPM | TEMPERATURE: 97.9 F | HEART RATE: 73 BPM | WEIGHT: 264.33 LBS | DIASTOLIC BLOOD PRESSURE: 83 MMHG | SYSTOLIC BLOOD PRESSURE: 131 MMHG

## 2022-02-02 DIAGNOSIS — G43.009 ATYPICAL MIGRAINE: Primary | ICD-10-CM

## 2022-02-02 LAB
ALBUMIN SERPL-MCNC: 4.1 G/DL (ref 3.5–5.2)
ALBUMIN/GLOB SERPL: 1.3 G/DL
ALP SERPL-CCNC: 60 U/L (ref 39–117)
ALT SERPL W P-5'-P-CCNC: 6 U/L (ref 1–33)
ANION GAP SERPL CALCULATED.3IONS-SCNC: 10.7 MMOL/L (ref 5–15)
AST SERPL-CCNC: 19 U/L (ref 1–32)
BACTERIA UR QL AUTO: ABNORMAL /HPF
BASOPHILS # BLD AUTO: 0.02 10*3/MM3 (ref 0–0.2)
BASOPHILS NFR BLD AUTO: 0.3 % (ref 0–1.5)
BILIRUB SERPL-MCNC: 0.2 MG/DL (ref 0–1.2)
BILIRUB UR QL STRIP: NEGATIVE
BUN SERPL-MCNC: 6 MG/DL (ref 6–20)
BUN/CREAT SERPL: 10.5 (ref 7–25)
CALCIUM SPEC-SCNC: 9.5 MG/DL (ref 8.6–10.5)
CHLORIDE SERPL-SCNC: 103 MMOL/L (ref 98–107)
CLARITY UR: CLEAR
CO2 SERPL-SCNC: 21.3 MMOL/L (ref 22–29)
COLOR UR: YELLOW
CREAT SERPL-MCNC: 0.57 MG/DL (ref 0.57–1)
DEPRECATED RDW RBC AUTO: 45.2 FL (ref 37–54)
EOSINOPHIL # BLD AUTO: 0.16 10*3/MM3 (ref 0–0.4)
EOSINOPHIL NFR BLD AUTO: 2.3 % (ref 0.3–6.2)
ERYTHROCYTE [DISTWIDTH] IN BLOOD BY AUTOMATED COUNT: 16.7 % (ref 12.3–15.4)
GFR SERPL CREATININE-BSD FRML MDRD: 128 ML/MIN/1.73
GLOBULIN UR ELPH-MCNC: 3.2 GM/DL
GLUCOSE SERPL-MCNC: 92 MG/DL (ref 65–99)
GLUCOSE UR STRIP-MCNC: NEGATIVE MG/DL
HCG INTACT+B SERPL-ACNC: NORMAL MIU/ML
HCT VFR BLD AUTO: 35.9 % (ref 34–46.6)
HGB BLD-MCNC: 12.1 G/DL (ref 12–15.9)
HGB UR QL STRIP.AUTO: NEGATIVE
HOLD SPECIMEN: NORMAL
HOLD SPECIMEN: NORMAL
HYALINE CASTS UR QL AUTO: ABNORMAL /LPF
IMM GRANULOCYTES # BLD AUTO: 0.01 10*3/MM3 (ref 0–0.05)
IMM GRANULOCYTES NFR BLD AUTO: 0.1 % (ref 0–0.5)
KETONES UR QL STRIP: NEGATIVE
LEUKOCYTE ESTERASE UR QL STRIP.AUTO: ABNORMAL
LIPASE SERPL-CCNC: 27 U/L (ref 13–60)
LYMPHOCYTES # BLD AUTO: 1.36 10*3/MM3 (ref 0.7–3.1)
LYMPHOCYTES NFR BLD AUTO: 19.6 % (ref 19.6–45.3)
MCH RBC QN AUTO: 25.3 PG (ref 26.6–33)
MCHC RBC AUTO-ENTMCNC: 33.7 G/DL (ref 31.5–35.7)
MCV RBC AUTO: 75.1 FL (ref 79–97)
MONOCYTES # BLD AUTO: 0.38 10*3/MM3 (ref 0.1–0.9)
MONOCYTES NFR BLD AUTO: 5.5 % (ref 5–12)
NEUTROPHILS NFR BLD AUTO: 5.02 10*3/MM3 (ref 1.7–7)
NEUTROPHILS NFR BLD AUTO: 72.2 % (ref 42.7–76)
NITRITE UR QL STRIP: NEGATIVE
NRBC BLD AUTO-RTO: 0 /100 WBC (ref 0–0.2)
PH UR STRIP.AUTO: 7.5 [PH] (ref 5–8)
PLATELET # BLD AUTO: 231 10*3/MM3 (ref 140–450)
PMV BLD AUTO: 11.2 FL (ref 6–12)
POTASSIUM SERPL-SCNC: 4.2 MMOL/L (ref 3.5–5.2)
PROT SERPL-MCNC: 7.3 G/DL (ref 6–8.5)
PROT UR QL STRIP: NEGATIVE
RBC # BLD AUTO: 4.78 10*6/MM3 (ref 3.77–5.28)
RBC # UR STRIP: ABNORMAL /HPF
REF LAB TEST METHOD: ABNORMAL
SODIUM SERPL-SCNC: 135 MMOL/L (ref 136–145)
SP GR UR STRIP: 1.01 (ref 1–1.03)
SQUAMOUS #/AREA URNS HPF: ABNORMAL /HPF
UROBILINOGEN UR QL STRIP: ABNORMAL
WBC # UR STRIP: ABNORMAL /HPF
WBC NRBC COR # BLD: 6.95 10*3/MM3 (ref 3.4–10.8)
WHOLE BLOOD HOLD SPECIMEN: NORMAL
WHOLE BLOOD HOLD SPECIMEN: NORMAL

## 2022-02-02 PROCEDURE — 85025 COMPLETE CBC W/AUTO DIFF WBC: CPT

## 2022-02-02 PROCEDURE — 81001 URINALYSIS AUTO W/SCOPE: CPT | Performed by: EMERGENCY MEDICINE

## 2022-02-02 PROCEDURE — 99284 EMERGENCY DEPT VISIT MOD MDM: CPT

## 2022-02-02 PROCEDURE — 36415 COLL VENOUS BLD VENIPUNCTURE: CPT

## 2022-02-02 PROCEDURE — 80053 COMPREHEN METABOLIC PANEL: CPT

## 2022-02-02 PROCEDURE — 83690 ASSAY OF LIPASE: CPT

## 2022-02-02 PROCEDURE — 70551 MRI BRAIN STEM W/O DYE: CPT

## 2022-02-02 PROCEDURE — 99283 EMERGENCY DEPT VISIT LOW MDM: CPT

## 2022-02-02 PROCEDURE — 84702 CHORIONIC GONADOTROPIN TEST: CPT | Performed by: EMERGENCY MEDICINE

## 2022-02-02 RX ORDER — BUTALBITAL, ACETAMINOPHEN AND CAFFEINE 300; 40; 50 MG/1; MG/1; MG/1
2 CAPSULE ORAL ONCE
Status: COMPLETED | OUTPATIENT
Start: 2022-02-02 | End: 2022-02-02

## 2022-02-02 RX ORDER — SODIUM CHLORIDE 0.9 % (FLUSH) 0.9 %
10 SYRINGE (ML) INJECTION AS NEEDED
Status: DISCONTINUED | OUTPATIENT
Start: 2022-02-02 | End: 2022-02-02 | Stop reason: HOSPADM

## 2022-02-02 RX ORDER — BUTALBITAL, ACETAMINOPHEN AND CAFFEINE 50; 325; 40 MG/1; MG/1; MG/1
1 TABLET ORAL EVERY 6 HOURS PRN
Qty: 10 TABLET | Refills: 0 | Status: SHIPPED | OUTPATIENT
Start: 2022-02-02

## 2022-02-02 RX ADMIN — BUTALBITAL, ACETAMINOPHEN AND CAFFEINE 2 CAPSULE: 300; 40; 50 CAPSULE ORAL at 13:16

## 2022-02-02 NOTE — ED PROVIDER NOTES
Subjective   Pt is 14 weeks pregnant. OB is at [facility] in [place].   States a few days ago her vision in her right eye went blurry and then resolved but has still had intermittent dizziness, headache and nausea. Seen at  yesterday and diagnosed with UTI. States headache is the worst part and tension like around front and wraps to the ears. No headache history in past.       History provided by:  Patient  Headache  Pain location:  Frontal  Quality:  Dull  Radiates to: left and right lateral sides of head.  Severity currently:  6/10  Severity at highest:  5/10  Onset quality:  Gradual  Duration:  3 days  Timing:  Intermittent  Progression:  Worsening  Chronicity:  New  Associated symptoms: dizziness and nausea    Associated symptoms: no fatigue, no fever and no photophobia        Review of Systems   Constitutional: Negative for appetite change, chills, fatigue and fever.   Eyes: Negative.  Negative for photophobia.   Respiratory: Negative.    Gastrointestinal: Positive for nausea.   Endocrine: Negative.    Genitourinary: Negative.    Musculoskeletal: Negative.    Skin: Negative.    Allergic/Immunologic: Negative.  Negative for immunocompromised state.   Neurological: Positive for dizziness and headaches.   Hematological: Negative.    Psychiatric/Behavioral: Negative.    All other systems reviewed and are negative.      History reviewed. No pertinent past medical history.    No Known Allergies    Past Surgical History:   Procedure Laterality Date   • CHOLECYSTECTOMY         History reviewed. No pertinent family history.    Social History     Socioeconomic History   • Marital status: Single   Tobacco Use   • Smoking status: Former Smoker     Packs/day: 0.10     Types: Cigarettes   • Smokeless tobacco: Never Used   Vaping Use   • Vaping Use: Never used   Substance and Sexual Activity   • Alcohol use: Never   • Drug use: Not Currently     Types: Marijuana           Objective   Physical Exam  Vitals and nursing note  reviewed.   Constitutional:       General: She is not in acute distress.     Appearance: Normal appearance. She is obese. She is not toxic-appearing.   HENT:      Head: Normocephalic and atraumatic.      Mouth/Throat:      Mouth: Mucous membranes are moist.   Eyes:      General: No scleral icterus.  Cardiovascular:      Rate and Rhythm: Normal rate and regular rhythm.      Pulses: Normal pulses.      Heart sounds: Normal heart sounds.   Pulmonary:      Effort: Pulmonary effort is normal. No respiratory distress.      Breath sounds: Normal breath sounds.   Abdominal:      General: There is distension (consistent with pregnancy).      Palpations: Abdomen is soft.      Tenderness: There is no abdominal tenderness.   Musculoskeletal:         General: Normal range of motion.      Cervical back: Normal range of motion and neck supple.   Skin:     General: Skin is warm and dry.   Neurological:      General: No focal deficit present.      Mental Status: She is alert and oriented to person, place, and time. Mental status is at baseline.   Psychiatric:         Mood and Affect: Mood is anxious.         Behavior: Behavior normal.             MDM  Number of Diagnoses or Management Options  Atypical migraine  Diagnosis management comments: Pt is a 27 y.o. female that presents today with Patient presents with:  Headache  Dizziness  Abdominal Pain       Work up today:  Lab Results (last 24 hours)     Procedure Component Value Units Date/Time    POC Urinalysis Dipstick, Multipro (Automated dipstick) ([id number])    (Abnormal) Collected: 02/01/22 1729    Specimen: Urine Updated: 02/01/22 1730     Color Yellow     Clarity, UA Clear     Glucose, UA Negative mg/dL      Bilirubin Negative     Ketones, UA Negative     Specific Gravity  1.030     Blood, UA Negative     pH, Urine 5.5     Protein, POC Negative mg/dL      Urobilinogen, UA Normal     Nitrite, UA Negative     Leukocytes Trace    Urine Culture - Urine, Urine, Clean Catch  ([id number])  (Normal)   Collected: 02/01/22 1749    Specimen: Urine, Clean Catch Updated: 02/02/22 1248     Urine Culture No growth    CBC & Differential ([id number])  (Abnormal) Collected: 02/02/22 1137    Specimen: Blood from Arm, Right Updated: 02/02/22 1146    Narrative:      The following orders were created for panel order CBC & Differential.  Procedure                               Abnormality         Status                       ---------                               -----------         ------                       CBC Auto Differential([id number])        Abnormal            Final result                   Please view results for these tests on the individual orders.    Comprehensive Metabolic Panel ([id number])  (Abnormal) Collected: 02/02/22 1137    Specimen: Blood from Arm, Right Updated: 02/02/22 1210     Glucose 92 mg/dL      BUN 6 mg/dL      Creatinine 0.57 mg/dL      Sodium 135 mmol/L      Potassium 4.2 mmol/L      Chloride 103 mmol/L      CO2 21.3 mmol/L      Calcium 9.5 mg/dL      Total Protein 7.3 g/dL      Albumin 4.10 g/dL      ALT (SGPT) 6 U/L      AST (SGOT) 19 U/L      Alkaline Phosphatase 60 U/L      Total Bilirubin 0.2 mg/dL      eGFR Non African Amer 128 mL/min/1.73      Globulin 3.2 gm/dL      A/G Ratio 1.3 g/dL      BUN/Creatinine Ratio 10.5     Anion Gap 10.7 mmol/L     Narrative:      GFR Normal >60  Chronic Kidney Disease <60  Kidney Failure <15      Lipase ([id number])  (Normal) Collected: 02/02/22 1137    Specimen: Blood from Arm, Right Updated: 02/02/22 1210     Lipase 27 U/L     hCG, Quantitative, Pregnancy ([id number]) Collected: 02/02/22 1137    Specimen: Blood from Arm, Right Updated: 02/02/22 1221     HCG Quantitative 67,604.00 mIU/mL     Narrative:      HCG Ranges by Gestational Age    Females - non-pregnant premenopausal   </= 1mIU/mL HCG  Females - postmenopausal               </= 7mIU/mL HCG    3 Weeks       5.4   -      72 mIU/mL  4 Weeks      10.2   -     708 mIU/mL  5 Weeks        217   -   8,245 mIU/mL  6 Weeks       152   -  32,177 mIU/mL  7 Weeks     4,059   - 153,767 mIU/mL  8 Weeks    31,366   - 149,094 mIU/mL  9 Weeks    59,109   - 135,901 mIU/mL  10 Weeks   44,186   - 170,409 mIU/mL  12 Weeks   27,107   - 201,615 mIU/mL  14 Weeks   24,302   -  93,646 mIU/mL  15 Weeks   12,540   -  69,747 mIU/mL  16 Weeks    8,904   -  55,332 mIU/mL  17 Weeks    8,240   -  51,793 mIU/mL  18 Weeks    9,649   -  55,271 mIU/mL    Results may be falsely decreased if patient taking Biotin.      CBC Auto Differential ([id number])  (Abnormal) Collected: 02/02/22 1137    Specimen: Blood from Arm, Right Updated: 02/02/22 1146     WBC 6.95 10*3/mm3      RBC 4.78 10*6/mm3      Hemoglobin 12.1 g/dL      Hematocrit 35.9 %      MCV 75.1 fL      MCH 25.3 pg      MCHC 33.7 g/dL      RDW 16.7 %      RDW-SD 45.2 fl      MPV 11.2 fL      Platelets 231 10*3/mm3      Neutrophil % 72.2 %      Lymphocyte % 19.6 %      Monocyte % 5.5 %      Eosinophil % 2.3 %      Basophil % 0.3 %      Immature Grans % 0.1 %      Neutrophils, Absolute 5.02 10*3/mm3      Lymphocytes, Absolute 1.36 10*3/mm3      Monocytes, Absolute 0.38 10*3/mm3      Eosinophils, Absolute 0.16 10*3/mm3      Basophils, Absolute 0.02 10*3/mm3      Immature Grans, Absolute 0.01 10*3/mm3      nRBC 0.0 /100 WBC     Urinalysis With Microscopic If Indicated (No Culture) - Urine, Clean   Catch ([id number])  (Abnormal) Collected: 02/02/22 1226    Specimen: Urine, Clean Catch Updated: 02/02/22 1247     Color, UA Yellow     Appearance, UA Clear     pH, UA 7.5     Specific Gravity, UA 1.011     Glucose, UA Negative     Ketones, UA Negative     Bilirubin, UA Negative     Blood, UA Negative     Protein, UA Negative     Leuk Esterase, UA Small (1+)     Nitrite, UA Negative     Urobilinogen, UA 0.2 E.U./dL    Urinalysis, Microscopic Only - Urine, Clean Catch ([id number])  (Abnormal)   Collected: 02/02/22 1226    Specimen: Urine, Clean Catch Updated: 02/02/22 1247     RBC, UA  0-2 /HPF      WBC, UA 0-2 /HPF      Bacteria, UA None Seen /HPF      Squamous Epithelial Cells, UA 3-6 /HPF      Hyaline Casts, UA None Seen /LPF      Methodology Automated Microscopy      MRI Brain Without Contrast    Result Date: 2/2/2022  PROCEDURE: MRI BRAIN WO CONTRAST  COMPARISON: [facility], MR, BRAIN W/WO CONTRAST, 10/22/2018, 11:55.  INDICATIONS: RIGHT VISION LOSS, NAUSEA X TODAY.      TECHNIQUE: A variety of imaging planes and parameters were utilized for visualization of suspected pathology.  Images were performed without contrast.  FINDINGS:  Diffusion-weighted images are negative for acute infarction.  There is no evidence of intracranial hemorrhage on susceptibility weighted imaging.  No focal brain lesion, mass or mass effect is seen.  The ventricles are normal in size and configuration.  Intravenous contrast was not given to assess for abnormal enhancement.  The orbits appear normal bilaterally.  Visualized extracranial soft tissues appear normal.  No focal calvarial abnormality is seen.        1. No acute intracranial abnormality      Michael Shelton M.D.       Electronically Signed and Approved By: Michael Shelton M.D. on 2/02/2022 at 14:55              @No orders to display     Pt presents with several complaints. 14 weeks pregnant. Biggest complaint is headache and dizziness and possible vision loss on right eye a few days ago. Tried several meds at home without relief. Work up today unremarkable. MRI normal. Blood work normal. Fetal heart sounds 160 bpm per RN.  Fioricet helped with headache significantly. Stable for d/c home.     The patient will pursue further outpatient evaluation with the primary care physician or other designated or consulting physician as outlined in the discharge instructions. They are agreeable to this plan of care and follow-up instructions have been explained in detail. The patient has received these instructions in written format and have expressed  an understanding of the discharge instructions. The patient is aware that any significant change in condition or worsening of symptoms should prompt an immediate return to this or the closest emergency department or call to 911.  Pt is otherwise non toxic and non ill appearing and stable for d/c home.  Pt is in agreement with this.  All questions answered at bedside.          Amount and/or Complexity of Data Reviewed  Clinical lab tests: reviewed  Tests in the radiology section of CPT®: reviewed    Risk of Complications, Morbidity, and/or Mortality  Presenting problems: moderate  Diagnostic procedures: moderate  Management options: moderate    Patient Progress  Patient progress: stable      Final diagnoses:   Atypical migraine       ED Disposition  ED Disposition     ED Disposition Condition Comment    Discharge Stable           Yonis Connors, APRN  [address] [facility] [address] [place] [address] [phone]               Medication List      New Prescriptions    butalbital-acetaminophen-caffeine -40 MG per tablet  Commonly known as: FIORICET, ESGIC  Take 1 tablet by mouth Every 6 (Six) Hours As Needed for Headache.           Where to Get Your Medications      These medications were sent to [facility] [address] [place], [place] [address] [address] [place]  [phone] [facility] [phone] [place] [address] [place] [address]    Phone: [phone]   · butalbital-acetaminophen-caffeine -40 MG per tablet          Gallo Kelley PA  02/02/22 1254

## 2022-06-28 ENCOUNTER — HOSPITAL ENCOUNTER (OUTPATIENT)
Facility: HOSPITAL | Age: 27
Discharge: HOME | End: 2022-06-28
Attending: OBSTETRICS & GYNECOLOGY | Admitting: OBSTETRICS & GYNECOLOGY

## 2022-06-28 ENCOUNTER — HOSPITAL ENCOUNTER (OUTPATIENT)
Facility: HOSPITAL | Age: 27
End: 2022-06-28
Attending: OBSTETRICS & GYNECOLOGY | Admitting: OBSTETRICS & GYNECOLOGY

## 2022-06-28 VITALS
HEIGHT: 64 IN | OXYGEN SATURATION: 100 % | DIASTOLIC BLOOD PRESSURE: 101 MMHG | SYSTOLIC BLOOD PRESSURE: 153 MMHG | WEIGHT: 286 LBS | HEART RATE: 98 BPM | TEMPERATURE: 98.4 F | BODY MASS INDEX: 48.83 KG/M2 | RESPIRATION RATE: 20 BRPM

## 2022-06-28 LAB
ALBUMIN SERPL-MCNC: 3.3 G/DL (ref 3.5–5.2)
ALBUMIN/GLOB SERPL: 0.9 G/DL
ALP SERPL-CCNC: 94 U/L (ref 39–117)
ALT SERPL W P-5'-P-CCNC: 7 U/L (ref 1–33)
ANION GAP SERPL CALCULATED.3IONS-SCNC: 11.6 MMOL/L (ref 5–15)
AST SERPL-CCNC: 14 U/L (ref 1–32)
BILIRUB SERPL-MCNC: <0.2 MG/DL (ref 0–1.2)
BUN SERPL-MCNC: 9 MG/DL (ref 6–20)
BUN/CREAT SERPL: 18.8 (ref 7–25)
CALCIUM SPEC-SCNC: 9.3 MG/DL (ref 8.6–10.5)
CHLORIDE SERPL-SCNC: 103 MMOL/L (ref 98–107)
CO2 SERPL-SCNC: 20.4 MMOL/L (ref 22–29)
CREAT SERPL-MCNC: 0.48 MG/DL (ref 0.57–1)
CREAT UR-MCNC: 99.5 MG/DL
DEPRECATED RDW RBC AUTO: 39.2 FL (ref 37–54)
EGFRCR SERPLBLD CKD-EPI 2021: 134.2 ML/MIN/1.73
ERYTHROCYTE [DISTWIDTH] IN BLOOD BY AUTOMATED COUNT: 13.8 % (ref 12.3–15.4)
GLOBULIN UR ELPH-MCNC: 3.6 GM/DL
GLUCOSE SERPL-MCNC: 86 MG/DL (ref 65–99)
HCT VFR BLD AUTO: 34.1 % (ref 34–46.6)
HGB BLD-MCNC: 11.1 G/DL (ref 12–15.9)
MCH RBC QN AUTO: 25.4 PG (ref 26.6–33)
MCHC RBC AUTO-ENTMCNC: 32.6 G/DL (ref 31.5–35.7)
MCV RBC AUTO: 78 FL (ref 79–97)
PLATELET # BLD AUTO: 200 10*3/MM3 (ref 140–450)
PMV BLD AUTO: 12.1 FL (ref 6–12)
POTASSIUM SERPL-SCNC: 4.2 MMOL/L (ref 3.5–5.2)
PROT ?TM UR-MCNC: 9.7 MG/DL
PROT SERPL-MCNC: 6.9 G/DL (ref 6–8.5)
PROT/CREAT UR: 0.1 MG/G{CREAT}
RBC # BLD AUTO: 4.37 10*6/MM3 (ref 3.77–5.28)
SODIUM SERPL-SCNC: 135 MMOL/L (ref 136–145)
WBC NRBC COR # BLD: 12.3 10*3/MM3 (ref 3.4–10.8)

## 2022-06-28 PROCEDURE — 84156 ASSAY OF PROTEIN URINE: CPT | Performed by: OBSTETRICS & GYNECOLOGY

## 2022-06-28 PROCEDURE — 82570 ASSAY OF URINE CREATININE: CPT | Performed by: OBSTETRICS & GYNECOLOGY

## 2022-06-28 PROCEDURE — G0463 HOSPITAL OUTPT CLINIC VISIT: HCPCS

## 2022-06-28 PROCEDURE — 80053 COMPREHEN METABOLIC PANEL: CPT | Performed by: OBSTETRICS & GYNECOLOGY

## 2022-06-28 PROCEDURE — 85027 COMPLETE CBC AUTOMATED: CPT | Performed by: OBSTETRICS & GYNECOLOGY

## 2022-06-28 PROCEDURE — 59025 FETAL NON-STRESS TEST: CPT

## 2022-06-28 RX ORDER — ACETAMINOPHEN 500 MG
1000 TABLET ORAL ONCE
Status: COMPLETED | OUTPATIENT
Start: 2022-06-28 | End: 2022-06-28

## 2022-06-28 RX ADMIN — ACETAMINOPHEN 1000 MG: 500 TABLET ORAL at 22:41

## 2023-03-24 PROBLEM — O09.899 MATERNAL VARICELLA, NON-IMMUNE: Status: ACTIVE | Noted: 2022-01-20

## 2023-03-24 PROBLEM — O09.899 RUBELLA NON-IMMUNE STATUS, ANTEPARTUM: Status: ACTIVE | Noted: 2022-01-20

## 2023-03-24 PROBLEM — Z28.39 RUBELLA NON-IMMUNE STATUS, ANTEPARTUM: Status: ACTIVE | Noted: 2022-01-20

## 2023-03-24 PROBLEM — F12.90 MARIJUANA USE: Status: ACTIVE | Noted: 2022-01-20

## 2023-03-24 PROBLEM — Z28.39 MATERNAL VARICELLA, NON-IMMUNE: Status: ACTIVE | Noted: 2022-01-20

## 2023-03-24 PROBLEM — O36.5990 PREGNANCY AFFECTED BY FETAL GROWTH RESTRICTION: Status: ACTIVE | Noted: 2022-07-11

## 2023-03-28 PROBLEM — J30.2 SEASONAL ALLERGIES: Status: ACTIVE | Noted: 2023-03-28

## 2023-03-28 PROBLEM — E78.6 LOW HDL (UNDER 40): Status: ACTIVE | Noted: 2023-03-28

## 2023-04-11 ENCOUNTER — OFFICE VISIT (OUTPATIENT)
Dept: FAMILY MEDICINE CLINIC | Facility: CLINIC | Age: 28
End: 2023-04-11
Payer: COMMERCIAL

## 2023-04-11 VITALS
DIASTOLIC BLOOD PRESSURE: 72 MMHG | WEIGHT: 280 LBS | TEMPERATURE: 98.3 F | OXYGEN SATURATION: 98 % | BODY MASS INDEX: 47.8 KG/M2 | HEART RATE: 94 BPM | HEIGHT: 64 IN | SYSTOLIC BLOOD PRESSURE: 130 MMHG

## 2023-04-11 DIAGNOSIS — E78.6 LOW HDL (UNDER 40): Primary | ICD-10-CM

## 2023-04-11 DIAGNOSIS — R73.01 IMPAIRED FASTING GLUCOSE: ICD-10-CM

## 2023-04-11 DIAGNOSIS — R21 RASH: ICD-10-CM

## 2023-04-11 DIAGNOSIS — Z13.29 SCREENING FOR THYROID DISORDER: ICD-10-CM

## 2023-04-11 DIAGNOSIS — E66.01 CLASS 3 SEVERE OBESITY DUE TO EXCESS CALORIES WITH SERIOUS COMORBIDITY AND BODY MASS INDEX (BMI) OF 45.0 TO 49.9 IN ADULT: ICD-10-CM

## 2023-04-11 DIAGNOSIS — F41.9 ANXIETY: ICD-10-CM

## 2023-04-11 DIAGNOSIS — L84 CORN OF FOOT: ICD-10-CM

## 2023-04-11 DIAGNOSIS — J30.2 SEASONAL ALLERGIES: ICD-10-CM

## 2023-04-11 DIAGNOSIS — R04.0 BLEEDING FROM THE NOSE: ICD-10-CM

## 2023-04-11 DIAGNOSIS — F33.0 MILD EPISODE OF RECURRENT MAJOR DEPRESSIVE DISORDER: ICD-10-CM

## 2023-04-11 PROBLEM — T17.1XXA NASAL FOREIGN BODY, INITIAL ENCOUNTER: Status: ACTIVE | Noted: 2023-04-11

## 2023-04-11 PROBLEM — T17.1XXA NASAL FOREIGN BODY, INITIAL ENCOUNTER: Status: RESOLVED | Noted: 2023-04-11 | Resolved: 2023-04-11

## 2023-04-11 LAB
ALBUMIN SERPL-MCNC: 4.4 G/DL (ref 3.5–5.2)
ALBUMIN/GLOB SERPL: 1.3 G/DL
ALP SERPL-CCNC: 76 U/L (ref 39–117)
ALT SERPL W P-5'-P-CCNC: 11 U/L (ref 1–33)
ANION GAP SERPL CALCULATED.3IONS-SCNC: 12.9 MMOL/L (ref 5–15)
ANISOCYTOSIS BLD QL: NORMAL
AST SERPL-CCNC: 15 U/L (ref 1–32)
BILIRUB SERPL-MCNC: <0.2 MG/DL (ref 0–1.2)
BUN SERPL-MCNC: 12 MG/DL (ref 6–20)
BUN/CREAT SERPL: 15.2 (ref 7–25)
BURR CELLS BLD QL SMEAR: NORMAL
CALCIUM SPEC-SCNC: 9.8 MG/DL (ref 8.6–10.5)
CHLORIDE SERPL-SCNC: 102 MMOL/L (ref 98–107)
CHOLEST SERPL-MCNC: 138 MG/DL (ref 0–200)
CO2 SERPL-SCNC: 27.1 MMOL/L (ref 22–29)
CREAT SERPL-MCNC: 0.79 MG/DL (ref 0.57–1)
DEPRECATED RDW RBC AUTO: 39.4 FL (ref 37–54)
EGFRCR SERPLBLD CKD-EPI 2021: 105.3 ML/MIN/1.73
EOSINOPHIL # BLD MANUAL: 0.09 10*3/MM3 (ref 0–0.4)
EOSINOPHIL NFR BLD MANUAL: 1 % (ref 0.3–6.2)
ERYTHROCYTE [DISTWIDTH] IN BLOOD BY AUTOMATED COUNT: 15.5 % (ref 12.3–15.4)
GLOBULIN UR ELPH-MCNC: 3.3 GM/DL
GLUCOSE SERPL-MCNC: 90 MG/DL (ref 65–99)
HBA1C MFR BLD: 5.9 % (ref 4.8–5.6)
HCT VFR BLD AUTO: 39.4 % (ref 34–46.6)
HDLC SERPL-MCNC: 33 MG/DL (ref 40–60)
HGB BLD-MCNC: 12 G/DL (ref 12–15.9)
LDLC SERPL CALC-MCNC: 79 MG/DL (ref 0–100)
LDLC/HDLC SERPL: 2.27 {RATIO}
LYMPHOCYTES # BLD MANUAL: 3.07 10*3/MM3 (ref 0.7–3.1)
LYMPHOCYTES NFR BLD MANUAL: 6.2 % (ref 5–12)
MCH RBC QN AUTO: 22.1 PG (ref 26.6–33)
MCHC RBC AUTO-ENTMCNC: 30.5 G/DL (ref 31.5–35.7)
MCV RBC AUTO: 72.4 FL (ref 79–97)
MICROCYTES BLD QL: NORMAL
MONOCYTES # BLD: 0.58 10*3/MM3 (ref 0.1–0.9)
NEUTROPHILS # BLD AUTO: 5.56 10*3/MM3 (ref 1.7–7)
NEUTROPHILS NFR BLD MANUAL: 59.8 % (ref 42.7–76)
NRBC BLD AUTO-RTO: 0 /100 WBC (ref 0–0.2)
OVALOCYTES BLD QL SMEAR: NORMAL
PLAT MORPH BLD: NORMAL
PLATELET # BLD AUTO: 313 10*3/MM3 (ref 140–450)
PMV BLD AUTO: 11.1 FL (ref 6–12)
POIKILOCYTOSIS BLD QL SMEAR: NORMAL
POTASSIUM SERPL-SCNC: 4.4 MMOL/L (ref 3.5–5.2)
PROT SERPL-MCNC: 7.7 G/DL (ref 6–8.5)
RBC # BLD AUTO: 5.44 10*6/MM3 (ref 3.77–5.28)
SODIUM SERPL-SCNC: 142 MMOL/L (ref 136–145)
TRIGL SERPL-MCNC: 150 MG/DL (ref 0–150)
VARIANT LYMPHS NFR BLD MANUAL: 33 % (ref 19.6–45.3)
VLDLC SERPL-MCNC: 26 MG/DL (ref 5–40)
WBC MORPH BLD: NORMAL
WBC NRBC COR # BLD: 9.3 10*3/MM3 (ref 3.4–10.8)

## 2023-04-11 PROCEDURE — 85007 BL SMEAR W/DIFF WBC COUNT: CPT | Performed by: NURSE PRACTITIONER

## 2023-04-11 PROCEDURE — 84439 ASSAY OF FREE THYROXINE: CPT | Performed by: NURSE PRACTITIONER

## 2023-04-11 PROCEDURE — 84443 ASSAY THYROID STIM HORMONE: CPT | Performed by: NURSE PRACTITIONER

## 2023-04-11 PROCEDURE — 80061 LIPID PANEL: CPT | Performed by: NURSE PRACTITIONER

## 2023-04-11 PROCEDURE — 80053 COMPREHEN METABOLIC PANEL: CPT | Performed by: NURSE PRACTITIONER

## 2023-04-11 PROCEDURE — 83036 HEMOGLOBIN GLYCOSYLATED A1C: CPT | Performed by: NURSE PRACTITIONER

## 2023-04-11 PROCEDURE — 85025 COMPLETE CBC W/AUTO DIFF WBC: CPT | Performed by: NURSE PRACTITIONER

## 2023-04-11 RX ORDER — DULOXETIN HYDROCHLORIDE 30 MG/1
30 CAPSULE, DELAYED RELEASE ORAL DAILY
Qty: 30 CAPSULE | Refills: 1 | Status: SHIPPED | OUTPATIENT
Start: 2023-04-11

## 2023-04-11 NOTE — PROGRESS NOTES
"     Follow Up Office Visit      Patient Name: Nikki Patel  : 1995   MRN: [id number]     Chief Complaint:    Chief Complaint   Patient presents with   • Foreign Body in Nose     Growth in the nose about 2 months ago.    • Anxiety   • Anemia       History of Present Illness: Nikki Patel is a 28 y.o. female who is here today to follow up for Anxiety, Antidepressant medication, Foreign body in the nose started 2 months ago.   Complains of corns on her feet that have been there for a few months and bother her when she walks has not tried anything over-the-counter.  Reports daytime fatigue, headache upon wakening and snoring.   Reports large discolored patches of skin on bilateral forearms and axilla that have not healed with OTC steroid cream or antifungals.  Reports somatic pains in various areas of the body that last anywhere from 5 minutes to 15 minutes, unknown how long its been going on but she reports \"a while\" described as mild.      PAP: 2018    Subjective      Review of Systems:   Review of Systems   Constitutional: Positive for fatigue.   Respiratory: Negative for shortness of breath.    Cardiovascular: Negative for chest pain, palpitations and leg swelling.   Gastrointestinal: Negative for constipation and diarrhea.   Genitourinary: Negative for difficulty urinating and dysuria.   Musculoskeletal: Negative for arthralgias and myalgias.   Skin: Positive for wound.   Neurological: Negative for dizziness, syncope, light-headedness and headaches.   Psychiatric/Behavioral: Negative for sleep disturbance.        Past Medical History: History reviewed. No pertinent past medical history.    Past Surgical History:   Past Surgical History:   Procedure Laterality Date   •  SECTION     • CHOLECYSTECTOMY         Family History: History reviewed. No pertinent family history.    Social History:   Social History     Socioeconomic History   • Marital status: Single   Tobacco Use   • Smoking status: " Every Day     Types: Cigarettes   • Smokeless tobacco: Never   • Tobacco comments:     2-3 cigarettes a day   Vaping Use   • Vaping Use: Every day   Substance and Sexual Activity   • Alcohol use: Never   • Drug use: Not Currently     Types: Marijuana       Medications:     Current Outpatient Medications:   •  ibuprofen (ADVIL,MOTRIN) 800 MG tablet, Take 1 tablet by mouth Every 8 (Eight) Hours., Disp: , Rfl:   •  acetaminophen (TYLENOL) 500 MG tablet, Take 1 tablet by mouth Every 6 (Six) Hours As Needed for Mild Pain. (Patient not taking: Reported on 4/11/2023), Disp: 30 tablet, Rfl: 0  •  benzonatate (TESSALON) 200 MG capsule, Take 1 capsule by mouth 3 (Three) Times a Day As Needed for Cough. (Patient not taking: Reported on 4/11/2023), Disp: 40 capsule, Rfl: 0  •  butalbital-acetaminophen-caffeine (FIORICET, ESGIC) -40 MG per tablet, Take 1 tablet by mouth Every 6 (Six) Hours As Needed for Headache. (Patient not taking: Reported on 4/11/2023), Disp: 10 tablet, Rfl: 0  •  docusate sodium 100 MG capsule, Take 1 capsule by mouth. (Patient not taking: Reported on 4/11/2023), Disp: , Rfl:   •  DULoxetine (CYMBALTA) 30 MG capsule, Take 1 capsule by mouth Daily., Disp: 30 capsule, Rfl: 1  •  ferrous sulfate 325 (65 FE) MG tablet, Take 1 tablet by mouth Daily. (Patient not taking: Reported on 4/11/2023), Disp: , Rfl:   •  labetalol (NORMODYNE) 200 MG tablet, Take 1 tablet by mouth. (Patient not taking: Reported on 4/11/2023), Disp: , Rfl:   •  naproxen (Naprosyn) 500 MG tablet, Take 1 tablet by mouth 2 (Two) Times a Day With Meals. (Patient not taking: Reported on 4/11/2023), Disp: 20 tablet, Rfl: 0  •  ondansetron ODT (ZOFRAN-ODT) 8 MG disintegrating tablet, DISSOLVE 1 TABLET IN MOUTH EVERY 8 HOURS AS NEEDED FOR NAUSEA (Patient not taking: Reported on 4/11/2023), Disp: , Rfl:   •  ondansetron ODT (ZOFRAN-ODT) 8 MG disintegrating tablet, Take 8 mg by mouth. (Patient not taking: Reported on 4/11/2023), Disp: , Rfl:  "  •  predniSONE (DELTASONE) 20 MG tablet, Take 2 tablets by mouth Daily. (Patient not taking: Reported on 4/11/2023), Disp: 10 tablet, Rfl: 0  •  promethazine (PHENERGAN) 12.5 MG tablet, Take 12.5 mg by mouth. (Patient not taking: Reported on 4/11/2023), Disp: , Rfl:   •  promethazine (PHENERGAN) 12.5 MG tablet, Take 12.5 mg by mouth. (Patient not taking: Reported on 4/11/2023), Disp: , Rfl:   •  promethazine-dextromethorphan (PROMETHAZINE-DM) 6.25-15 MG/5ML syrup, Take 5 mL by mouth 4 (Four) Times a Day As Needed for Cough. (Patient not taking: Reported on 4/11/2023), Disp: 118 mL, Rfl: 0    Allergies:   Allergies   Allergen Reactions   • Guava Flavor Other (See Comments)     Tongue has a \"fuzzy feeling\"           Objective     Physical Exam:  Vital Signs:   Vitals:    04/11/23 1125   BP: 130/72   BP Location: Left arm   Patient Position: Sitting   Pulse: 94   Temp: 98.3 °F (36.8 °C)   SpO2: 98%   Weight: 127 kg (280 lb)   Height: 162.6 cm (64\")     Body mass index is 48.06 kg/m².     Physical Exam  HENT:      Mouth/Throat:      Mouth: Mucous membranes are moist.   Cardiovascular:      Rate and Rhythm: Normal rate and regular rhythm.      Pulses: Normal pulses.      Heart sounds: No murmur heard.    No friction rub. No gallop.   Pulmonary:      Effort: Pulmonary effort is normal. No respiratory distress.      Breath sounds: Normal breath sounds. No stridor. No wheezing or rhonchi.   Abdominal:      General: Abdomen is flat. There is no distension.      Palpations: Abdomen is soft. There is no mass.   Musculoskeletal:        Feet:    Feet:      Comments: Painful corns.   Skin:     General: Skin is warm.      Capillary Refill: Capillary refill takes less than 2 seconds.   Neurological:      General: No focal deficit present.      Mental Status: She is alert and oriented to person, place, and time.   Psychiatric:         Mood and Affect: Mood normal.             Assessment / Plan      Assessment/Plan:   Diagnoses and " all orders for this visit:    1. Low HDL (under 40) (Primary)  -     Lipid Panel    2. Impaired fasting glucose  -     Comprehensive Metabolic Panel  -     Hemoglobin A1c    3. Seasonal allergies  -     CBC Auto Differential    4. Anxiety  -     DULoxetine (CYMBALTA) 30 MG capsule; Take 1 capsule by mouth Daily.  Dispense: 30 capsule; Refill: 1    5. Mild episode of recurrent major depressive disorder  -     DULoxetine (CYMBALTA) 30 MG capsule; Take 1 capsule by mouth Daily.  Dispense: 30 capsule; Refill: 1    6. Screening for thyroid disorder  -     TSH  -     T4, Free    7. Class 3 severe obesity due to excess calories with serious comorbidity and body mass index (BMI) of 45.0 to 49.9 in adult    8. [place] of foot  -     Ambulatory Referral to Podiatry    9. Bleeding from the nose  -     Ambulatory Referral to ENT (Otolaryngology)    10. Rash  -     Ambulatory Referral to Dermatology    1. Low HDL: Obtain lipid panel today in office.  Exercise 30 minutes/day at least 5 days/week.  Increase fresh fruits and vegetables, whole grains in diet.  Decrease fatty and fried foods, red meat and high-fat dairy products in diet.    2. IFG: Obtain A1c and CMP in office today to monitor.  Decrease carbohydrates in diet.  Weight loss to aid in preventing diabetes.  Exercise 30 minutes/day at least 5 days/week.   3. Seasonal allergies: Controlled without medications.   4. Anxiety: Start Cymbalta 30 mg daily.  Follow-up in 1 month  5. Mild episode of recurrent major depressive disorder: Start Cymbalta 30 mg daily.   6. TSH: Obtain thyroid-stimulating hormone and T4 today to monitor.  Denies palpitations.   7. Obesity: Decrease caloric intake.  Exercise at least 30 minutes/day 5 days/week.  Insurance does not cover saxenda, Wegovy or Contrave.  Discussed referral to dietitian, patient declined at this time.    8. [place] of foot: Ambulatory referral to podiatry per patient's request  9.  Nose bleed: Ambulatory referral to ENT per  "patient's request  10. Rash:  Ambulatory referral to Dermatology per patient's request          Follow Up:   Return in about 1 month (around 5/11/2023).    Jaylon Connors, APRN    \"Please note that portions of this note were completed with a voice recognition program.\"    "

## 2023-04-12 LAB
T4 FREE SERPL-MCNC: 1.41 NG/DL (ref 0.93–1.7)
TSH SERPL DL<=0.05 MIU/L-ACNC: 2.48 UIU/ML (ref 0.27–4.2)

## 2023-05-26 ENCOUNTER — TRANSCRIBE ORDERS (OUTPATIENT)
Dept: ADMINISTRATIVE | Facility: HOSPITAL | Age: 28
End: 2023-05-26

## 2023-05-26 DIAGNOSIS — J34.0 CARBUNCLE OF NOSE: Primary | ICD-10-CM

## 2023-05-26 DIAGNOSIS — J32.9 CHRONIC SINUSITIS, UNSPECIFIED LOCATION: ICD-10-CM

## 2023-06-16 DIAGNOSIS — F33.0 MILD EPISODE OF RECURRENT MAJOR DEPRESSIVE DISORDER: ICD-10-CM

## 2023-06-16 DIAGNOSIS — F41.9 ANXIETY: ICD-10-CM

## 2023-06-19 RX ORDER — DULOXETIN HYDROCHLORIDE 30 MG/1
30 CAPSULE, DELAYED RELEASE ORAL DAILY
Qty: 30 CAPSULE | Refills: 1 | Status: SHIPPED | OUTPATIENT
Start: 2023-06-19

## 2023-07-11 PROBLEM — J34.89 CONCHA BULLOSA: Status: ACTIVE | Noted: 2023-07-11

## 2023-07-11 PROBLEM — J34.2 DNS (DEVIATED NASAL SEPTUM): Status: ACTIVE | Noted: 2023-07-11

## 2023-07-11 PROBLEM — J34.3 HYPERTROPHY OF BOTH INFERIOR NASAL TURBINATES: Status: ACTIVE | Noted: 2023-07-11

## 2023-07-11 PROBLEM — M95.0 NASAL VALVE COLLAPSE: Status: ACTIVE | Noted: 2023-07-11

## 2023-07-11 PROBLEM — J32.9 CHRONIC SINUSITIS: Status: ACTIVE | Noted: 2023-07-11

## 2023-07-12 PROBLEM — J34.89 CONCHA BULLOSA: Status: RESOLVED | Noted: 2023-07-11 | Resolved: 2023-07-12

## 2023-07-12 PROBLEM — J34.3 HYPERTROPHY OF BOTH INFERIOR NASAL TURBINATES: Status: RESOLVED | Noted: 2023-07-11 | Resolved: 2023-07-12

## 2023-07-12 PROBLEM — J33.8 NASAL SINUS POLYP: Status: RESOLVED | Noted: 2023-07-12 | Resolved: 2023-07-12

## 2023-07-12 PROBLEM — J34.2 DNS (DEVIATED NASAL SEPTUM): Status: RESOLVED | Noted: 2023-07-11 | Resolved: 2023-07-12

## 2023-07-12 PROBLEM — M95.0 NASAL VALVE COLLAPSE: Status: RESOLVED | Noted: 2023-07-11 | Resolved: 2023-07-12

## 2023-07-12 PROBLEM — R04.0 BLEEDING FROM THE NOSE: Status: RESOLVED | Noted: 2023-04-11 | Resolved: 2023-07-12

## 2023-07-12 PROBLEM — J33.8 NASAL SINUS POLYP: Status: ACTIVE | Noted: 2023-07-12

## 2023-07-12 PROBLEM — J32.9 CHRONIC SINUSITIS: Status: RESOLVED | Noted: 2023-07-11 | Resolved: 2023-07-12

## 2023-08-16 DIAGNOSIS — F33.0 MILD EPISODE OF RECURRENT MAJOR DEPRESSIVE DISORDER: ICD-10-CM

## 2023-08-16 DIAGNOSIS — F41.9 ANXIETY: ICD-10-CM

## 2023-08-16 RX ORDER — DULOXETIN HYDROCHLORIDE 30 MG/1
30 CAPSULE, DELAYED RELEASE ORAL DAILY
Qty: 30 CAPSULE | Refills: 0 | Status: SHIPPED | OUTPATIENT
Start: 2023-08-16

## 2023-11-28 ENCOUNTER — HOSPITAL ENCOUNTER (EMERGENCY)
Facility: HOSPITAL | Age: 28
Discharge: HOME | End: 2023-11-28
Attending: EMERGENCY MEDICINE | Admitting: EMERGENCY MEDICINE
Payer: COMMERCIAL

## 2023-11-28 ENCOUNTER — APPOINTMENT (OUTPATIENT)
Dept: CT IMAGING | Facility: HOSPITAL | Age: 28
End: 2023-11-28
Payer: COMMERCIAL

## 2023-11-28 VITALS
DIASTOLIC BLOOD PRESSURE: 82 MMHG | OXYGEN SATURATION: 100 % | BODY MASS INDEX: 45.2 KG/M2 | TEMPERATURE: 97.8 F | RESPIRATION RATE: 18 BRPM | WEIGHT: 264.77 LBS | SYSTOLIC BLOOD PRESSURE: 126 MMHG | HEIGHT: 64 IN | HEART RATE: 76 BPM

## 2023-11-28 DIAGNOSIS — Z87.19 HISTORY OF DIVERTICULITIS: ICD-10-CM

## 2023-11-28 DIAGNOSIS — R10.32 ABDOMINAL PAIN, ACUTE, LEFT LOWER QUADRANT: Primary | ICD-10-CM

## 2023-11-28 DIAGNOSIS — K57.92 DIVERTICULITIS: ICD-10-CM

## 2023-11-28 LAB
ALBUMIN SERPL-MCNC: 4.1 G/DL (ref 3.5–5.2)
ALBUMIN/GLOB SERPL: 1.2 G/DL
ALP SERPL-CCNC: 80 U/L (ref 39–117)
ALT SERPL W P-5'-P-CCNC: 8 U/L (ref 1–33)
ANION GAP SERPL CALCULATED.3IONS-SCNC: 9.5 MMOL/L (ref 5–15)
AST SERPL-CCNC: 20 U/L (ref 1–32)
BASOPHILS # BLD AUTO: 0.04 10*3/MM3 (ref 0–0.2)
BASOPHILS NFR BLD AUTO: 0.4 % (ref 0–1.5)
BILIRUB SERPL-MCNC: 0.3 MG/DL (ref 0–1.2)
BUN SERPL-MCNC: 9 MG/DL (ref 6–20)
BUN/CREAT SERPL: 15.3 (ref 7–25)
CALCIUM SPEC-SCNC: 8.7 MG/DL (ref 8.6–10.5)
CHLORIDE SERPL-SCNC: 103 MMOL/L (ref 98–107)
CO2 SERPL-SCNC: 22.5 MMOL/L (ref 22–29)
CREAT SERPL-MCNC: 0.59 MG/DL (ref 0.57–1)
DEPRECATED RDW RBC AUTO: 42.5 FL (ref 37–54)
EGFRCR SERPLBLD CKD-EPI 2021: 126.9 ML/MIN/1.73
EOSINOPHIL # BLD AUTO: 0.12 10*3/MM3 (ref 0–0.4)
EOSINOPHIL NFR BLD AUTO: 1.3 % (ref 0.3–6.2)
ERYTHROCYTE [DISTWIDTH] IN BLOOD BY AUTOMATED COUNT: 16.8 % (ref 12.3–15.4)
GLOBULIN UR ELPH-MCNC: 3.3 GM/DL
GLUCOSE SERPL-MCNC: 102 MG/DL (ref 65–99)
HCG INTACT+B SERPL-ACNC: <0.5 MIU/ML
HCT VFR BLD AUTO: 35.5 % (ref 34–46.6)
HGB BLD-MCNC: 10.7 G/DL (ref 12–15.9)
HOLD SPECIMEN: NORMAL
HOLD SPECIMEN: NORMAL
IMM GRANULOCYTES # BLD AUTO: 0.02 10*3/MM3 (ref 0–0.05)
IMM GRANULOCYTES NFR BLD AUTO: 0.2 % (ref 0–0.5)
LIPASE SERPL-CCNC: 22 U/L (ref 13–60)
LYMPHOCYTES # BLD AUTO: 1.27 10*3/MM3 (ref 0.7–3.1)
LYMPHOCYTES NFR BLD AUTO: 14.1 % (ref 19.6–45.3)
MCH RBC QN AUTO: 21.4 PG (ref 26.6–33)
MCHC RBC AUTO-ENTMCNC: 30.1 G/DL (ref 31.5–35.7)
MCV RBC AUTO: 70.9 FL (ref 79–97)
MONOCYTES # BLD AUTO: 0.48 10*3/MM3 (ref 0.1–0.9)
MONOCYTES NFR BLD AUTO: 5.3 % (ref 5–12)
NEUTROPHILS NFR BLD AUTO: 7.08 10*3/MM3 (ref 1.7–7)
NEUTROPHILS NFR BLD AUTO: 78.7 % (ref 42.7–76)
NRBC BLD AUTO-RTO: 0 /100 WBC (ref 0–0.2)
PLATELET # BLD AUTO: 292 10*3/MM3 (ref 140–450)
PMV BLD AUTO: 10.9 FL (ref 6–12)
POTASSIUM SERPL-SCNC: 4.5 MMOL/L (ref 3.5–5.2)
PROT SERPL-MCNC: 7.4 G/DL (ref 6–8.5)
RBC # BLD AUTO: 5.01 10*6/MM3 (ref 3.77–5.28)
SODIUM SERPL-SCNC: 135 MMOL/L (ref 136–145)
WBC NRBC COR # BLD AUTO: 9.01 10*3/MM3 (ref 3.4–10.8)
WHOLE BLOOD HOLD COAG: NORMAL
WHOLE BLOOD HOLD SPECIMEN: NORMAL

## 2023-11-28 PROCEDURE — 96374 THER/PROPH/DIAG INJ IV PUSH: CPT

## 2023-11-28 PROCEDURE — 25010000002 KETOROLAC TROMETHAMINE PER 15 MG: Performed by: EMERGENCY MEDICINE

## 2023-11-28 PROCEDURE — 84702 CHORIONIC GONADOTROPIN TEST: CPT | Performed by: EMERGENCY MEDICINE

## 2023-11-28 PROCEDURE — 83690 ASSAY OF LIPASE: CPT | Performed by: EMERGENCY MEDICINE

## 2023-11-28 PROCEDURE — 36415 COLL VENOUS BLD VENIPUNCTURE: CPT

## 2023-11-28 PROCEDURE — 74177 CT ABD & PELVIS W/CONTRAST: CPT

## 2023-11-28 PROCEDURE — 99285 EMERGENCY DEPT VISIT HI MDM: CPT

## 2023-11-28 PROCEDURE — 25510000001 IOPAMIDOL PER 1 ML: Performed by: EMERGENCY MEDICINE

## 2023-11-28 PROCEDURE — 85025 COMPLETE CBC W/AUTO DIFF WBC: CPT | Performed by: EMERGENCY MEDICINE

## 2023-11-28 PROCEDURE — 80053 COMPREHEN METABOLIC PANEL: CPT | Performed by: EMERGENCY MEDICINE

## 2023-11-28 RX ORDER — METRONIDAZOLE 500 MG/1
500 TABLET ORAL 3 TIMES DAILY
Qty: 21 TABLET | Refills: 0 | Status: SHIPPED | OUTPATIENT
Start: 2023-11-28

## 2023-11-28 RX ORDER — HYDROCODONE BITARTRATE AND ACETAMINOPHEN 5; 325 MG/1; MG/1
1 TABLET ORAL EVERY 6 HOURS PRN
Qty: 12 TABLET | Refills: 0 | Status: SHIPPED | OUTPATIENT
Start: 2023-11-28

## 2023-11-28 RX ORDER — HYDROCODONE BITARTRATE AND ACETAMINOPHEN 5; 325 MG/1; MG/1
1 TABLET ORAL ONCE
Status: COMPLETED | OUTPATIENT
Start: 2023-11-28 | End: 2023-11-28

## 2023-11-28 RX ORDER — SODIUM CHLORIDE 0.9 % (FLUSH) 0.9 %
10 SYRINGE (ML) INJECTION AS NEEDED
Status: DISCONTINUED | OUTPATIENT
Start: 2023-11-28 | End: 2023-11-28 | Stop reason: HOSPADM

## 2023-11-28 RX ORDER — KETOROLAC TROMETHAMINE 30 MG/ML
30 INJECTION, SOLUTION INTRAMUSCULAR; INTRAVENOUS ONCE
Status: COMPLETED | OUTPATIENT
Start: 2023-11-28 | End: 2023-11-28

## 2023-11-28 RX ORDER — CIPROFLOXACIN 500 MG/1
500 TABLET, FILM COATED ORAL EVERY 12 HOURS
Qty: 20 TABLET | Refills: 0 | Status: SHIPPED | OUTPATIENT
Start: 2023-11-28

## 2023-11-28 RX ADMIN — IOPAMIDOL 100 ML: 755 INJECTION, SOLUTION INTRAVENOUS at 12:02

## 2023-11-28 RX ADMIN — KETOROLAC TROMETHAMINE 30 MG: 30 INJECTION, SOLUTION INTRAMUSCULAR; INTRAVENOUS at 12:14

## 2023-11-28 RX ADMIN — HYDROCODONE BITARTRATE AND ACETAMINOPHEN 1 TABLET: 5; 325 TABLET ORAL at 13:07

## 2023-11-28 NOTE — ED PROVIDER NOTES
Time: 11:38 AM EST  Date of encounter:  2023  Independent Historian/Clinical History and Information was obtained by:   Patient    History is limited by: N/A    Chief Complaint: Left lower quadrant abdominal pain since yesterday      History of Present Illness:  Patient is a 28 y.o. year old female who presents to the emergency department for evaluation of left lower quadrant abdominal pain since yesterday.    She has history of diverticulitis and states this feels similar except last time she had in the right side of her abdomen.    She denies any dysuria or urinary frequency or any fevers or chills or vomiting or diarrhea or signs of virus or sick contacts.        HPI    Patient Care Team  Primary Care Provider: Yonis Connors APRN    Past Medical History:     Allergies   Allergen Reactions    Guava Flavor Angioedema     Past Medical History:   Diagnosis Date    Anxiety     Autism spectrum     states she has never been diagnosed    Depression     PVC (premature ventricular contraction)     Sleep apnea      Past Surgical History:   Procedure Laterality Date     SECTION      CHOLECYSTECTOMY      ENDOSCOPIC FUNCTIONAL SINUS SURGERY (FESS) Bilateral 2023    Procedure: SEPTOPLASTY, BILATERAL INFERIOR TURBINATE SUBMUCOUS RESECTION WITH CAUTERIZATION, LEFT NASAL MASS EXCISION, ENDOSCOPIC SINUS SURGERY, PRINCE BULLECTOMY, NASAL VALVE COLLAPSE REPAIR WITH LATERA NASAL IMPLANTS;  Surgeon: Jimy Casillas MD;  Location: [facility] MAIN OR;  Service: ENT;  Laterality: Bilateral;     History reviewed. No pertinent family history.    Home Medications:  Prior to Admission medications    Medication Sig Start Date End Date Taking? Authorizing Provider   DULoxetine (CYMBALTA) 30 MG capsule Take 1 capsule by mouth once daily 23   Yonis Connors APRN   HYDROcodone-acetaminophen (NORCO) 7.5-325 MG per tablet Take 1 tablet by mouth Every 6 (Six) Hours As Needed for Moderate Pain (Pain). 23  "  Jimy Casillas MD   sodium chloride (Ocean Nasal Spray) 0.65 % nasal spray 2 sprays into the nostril(s) as directed by provider Every 1 (One) Hour While Awake. 7/12/23   Jimy Casillas MD        Social History:   Social History     Tobacco Use    Smoking status: Former     Types: Cigarettes    Smokeless tobacco: Never   Vaping Use    Vaping Use: Every day    Substances: Nicotine   Substance Use Topics    Alcohol use: Never    Drug use: Yes     Types: Marijuana     Comment: last was yesterday         Review of Systems:  Review of Systems   I performed a 10 point review of systems which was all negative, except for the positives found in the HPI above.  Physical Exam:  /82   Pulse 76   Temp 97.8 °F (36.6 °C) (Oral)   Resp 18   Ht 162.6 cm (64\")   Wt 120 kg (264 lb 12.4 oz)   SpO2 100%   BMI 45.45 kg/m²     Physical Exam   General: Awake alert and in mild to moderate distress due to abdominal pain    HEENT: Head normocephalic atraumatic, eyes PERRLA EOMI, nose normal, oropharynx normal.    Neck: Supple full range of motion, no meningismus, no lymphadenopathy    Heart: Regular rate and rhythm, no murmurs or rubs, 2+ radial pulses bilaterally    Lungs: Clear to auscultation bilaterally without wheezes or crackles, no respiratory distress    Abdomen: Soft, obese, moderately tender throughout the left lower quadrant and somewhat in the left upper quadrant, nondistended, no rebound or guarding    Skin: Warm, dry, no rash    Musculoskeletal: Normal range of motion, no lower extremity edema    Neurologic: Oriented x3, no motor deficits no sensory deficits    Psychiatric: Mood appears stable, no psychosis          Procedures:  Procedures      Medical Decision Making:      Comorbidities that affect care:    History of diverticulitis, Obesity    External Notes reviewed:    Previous Labs: I compared paired all of today's blood work results to her old baseline lab values and it looks like she is becoming gradually " more anemic with hemoglobin of 10.7 today as opposed to 12, 6 months ago.      The following orders were placed and all results were independently analyzed by me:  Orders Placed This Encounter   Procedures    CT Abdomen Pelvis With Contrast    [place] Draw    Comprehensive Metabolic Panel    Lipase    Urinalysis With Microscopic If Indicated (No Culture) - Urine, Clean Catch    hCG, Quantitative, Pregnancy    CBC Auto Differential    NPO Diet NPO Type: Strict NPO    Undress & Gown    Insert Peripheral IV    CBC & Differential    Green Top (Gel)    Lavender Top    Gold Top - SST    Light Blue Top       Medications Given in the Emergency Department:  Medications   sodium chloride 0.9 % flush 10 mL (has no administration in time range)   HYDROcodone-acetaminophen (NORCO) 5-325 MG per tablet 1 tablet (has no administration in time range)   ketorolac (TORADOL) injection 30 mg (30 mg Intravenous Given 11/28/23 1214)   iopamidol (ISOVUE-370) 76 % injection 100 mL (100 mL Intravenous Given 11/28/23 1202)        ED Course:         Labs:    Lab Results (last 24 hours)       Procedure Component Value Units Date/Time    CBC & Differential [[id number]]  (Abnormal) Collected: 11/28/23 1023    Specimen: Blood Updated: 11/28/23 1033    Narrative:      The following orders were created for panel order CBC & Differential.  Procedure                               Abnormality         Status                     ---------                               -----------         ------                     CBC Auto Differential[[id number]]        Abnormal            Final result                 Please view results for these tests on the individual orders.    Comprehensive Metabolic Panel [[id number]]  (Abnormal) Collected: 11/28/23 1023    Specimen: Blood Updated: 11/28/23 1053     Glucose 102 mg/dL      BUN 9 mg/dL      Creatinine 0.59 mg/dL      Sodium 135 mmol/L      Potassium 4.5 mmol/L      Comment: Slight hemolysis detected by analyzer. Result  may be falsely elevated.        Chloride 103 mmol/L      CO2 22.5 mmol/L      Calcium 8.7 mg/dL      Total Protein 7.4 g/dL      Albumin 4.1 g/dL      ALT (SGPT) 8 U/L      AST (SGOT) 20 U/L      Comment: Slight hemolysis detected by analyzer. Result may be falsely elevated.        Alkaline Phosphatase 80 U/L      Total Bilirubin 0.3 mg/dL      Globulin 3.3 gm/dL      A/G Ratio 1.2 g/dL      BUN/Creatinine Ratio 15.3     Anion Gap 9.5 mmol/L      eGFR 126.9 mL/min/1.73     Narrative:      GFR Normal >60  Chronic Kidney Disease <60  Kidney Failure <15      Lipase [[id number]]  (Normal) Collected: 11/28/23 1023    Specimen: Blood Updated: 11/28/23 1052     Lipase 22 U/L     hCG, Quantitative, Pregnancy [[id number]] Collected: 11/28/23 1023    Specimen: Blood Updated: 11/28/23 1049     HCG Quantitative <0.50 mIU/mL     Narrative:      HCG Ranges by Gestational Age    Females - non-pregnant premenopausal   </= 1mIU/mL HCG  Females - postmenopausal               </= 7mIU/mL HCG    3 Weeks       5.4   -      72 mIU/mL  4 Weeks      10.2   -     708 mIU/mL  5 Weeks       217   -   8,245 mIU/mL  6 Weeks       152   -  32,177 mIU/mL  7 Weeks     4,059   - 153,767 mIU/mL  8 Weeks    31,366   - 149,094 mIU/mL  9 Weeks    59,109   - 135,901 mIU/mL  10 Weeks   44,186   - 170,409 mIU/mL  12 Weeks   27,107   - 201,615 mIU/mL  14 Weeks   24,302   -  93,646 mIU/mL  15 Weeks   12,540   -  69,747 mIU/mL  16 Weeks    8,904   -  55,332 mIU/mL  17 Weeks    8,240   -  51,793 mIU/mL  18 Weeks    9,649   -  55,271 mIU/mL      CBC Auto Differential [[id number]]  (Abnormal) Collected: 11/28/23 1023    Specimen: Blood Updated: 11/28/23 1033     WBC 9.01 10*3/mm3      RBC 5.01 10*6/mm3      Hemoglobin 10.7 g/dL      Hematocrit 35.5 %      MCV 70.9 fL      MCH 21.4 pg      MCHC 30.1 g/dL      RDW 16.8 %      RDW-SD 42.5 fl      MPV 10.9 fL      Platelets 292 10*3/mm3      Neutrophil % 78.7 %      Lymphocyte % 14.1 %      Monocyte % 5.3 %       Eosinophil % 1.3 %      Basophil % 0.4 %      Immature Grans % 0.2 %      Neutrophils, Absolute 7.08 10*3/mm3      Lymphocytes, Absolute 1.27 10*3/mm3      Monocytes, Absolute 0.48 10*3/mm3      Eosinophils, Absolute 0.12 10*3/mm3      Basophils, Absolute 0.04 10*3/mm3      Immature Grans, Absolute 0.02 10*3/mm3      nRBC 0.0 /100 WBC              Imaging:    CT Abdomen Pelvis With Contrast    Result Date: 11/28/2023  PROCEDURE: CT ABDOMEN PELVIS W CONTRAST  COMPARISON: [facility], CT, ABDOMEN/PELVIS WITH CONTRAST, 2/26/2019, 5:51.  INDICATIONS: left side abdominal pain, constipation, eval diverticulitis  TECHNIQUE: After obtaining the patient's consent, CT images were created with non-ionic intravenous contrast material.   PROTOCOL:   Standard imaging protocol performed    RADIATION:   DLP: 1086.7 mGy*cm   Automated exposure control was utilized to minimize radiation dose. CONTRAST: 100 cc Isovue 370 I.V.  FINDINGS:    Lung bases:  Small subpleural nodular opacity noted at the left base is measuring 4 mm likely post inflammatory or infectious.  This is not seen on the previous study.  Minimal dependent atelectasis noted bilaterally.  No free air noted below the diaphragm.   Organs:  Patient is status post cholecystectomy.  The liver, spleen, pancreas, kidneys and adrenal glands are grossly unremarkable appearance  GI tract:  Stomach is grossly unremarkable in appearance.  There are nondistended fluid-filled loops of small bowel noted in the mid lower abdomen.  The ileocecal valve is grossly unremarkable in appearance.  The appendix is visualized and appears within normal limits.  Proximal portions of the colon unremarkable in appearance.  There is inflammatory changes of the distal descending and proximal sigmoid colon associated with underlying diverticular disease.  No evidence of perforation or focal abscess formation is noted.  Scattered lymph nodes within the mesentery likely reactive.  Pelvis:   The urinary bladder is grossly unremarkable in appearance.  Uterus is unremarkable.  There is a simple appearing cyst within the right ovary measuring approximately 3.5 cm.  No suspicious pelvic adenopathy noted.  Small amount of free fluid noted within the pelvis.  This is greater on the right.  Retroperitoneum:  The aorta is normal in caliber.  There is no suspicious retroperitoneal adenopathy  Bones and soft tissues:  No destructive bone lesion noted.  Multilevel degenerative changes are noted of the spine        1. Inflammatory change associated with mild wall thickening and diverticular disease involving the distal descending and proximal sigmoid colon compatible with acute diverticulitis.  No evidence of perforation or abscess formation noted at this time 2. New 4 mm nodule at the left lung base likely post inflammatory or infectious in nature.  No routine follow-up recommended in this age group 3. Probable physiologic cyst right ovary.  If the patient has right-sided pelvic pain pelvic ultrasound could be considered 4. Small amount of free fluid within the pelvis which may be related to underlying diverticulitis or physiologic changes in a young female     BHUPINDER CUNNINGHAM MD       Electronically Signed and Approved By: BHUPINDER CUNNINGHAM MD on 11/28/2023 at 12:47                Differential Diagnosis and Discussion:    Abdominal Pain: Based on the patient's signs and symptoms, I considered abdominal aortic aneurysm, small bowel obstruction, pancreatitis, acute cholecystitis, acute appendecitis, peptic ulcer disease, gastritis, colitis, endocrine disorders, irritable bowel syndrome and other differential diagnosis an etiology of the patient's abdominal pain.    All labs were reviewed and interpreted by me.  CT scan radiology impression was interpreted by me.    MDM     Amount and/or Complexity of Data Reviewed  Clinical lab tests: reviewed  Tests in the radiology section of CPT®: reviewed  Decide to obtain  previous medical records or to obtain history from someone other than the patient: yes         This patient is a 28-year-old female with previous history of diverticulitis now presenting with left lower quadrant abdominal pain since yesterday.    Due to her tenderness on exam I will go ahead and get CT imaging of the abdomen pelvis to evaluate for diverticulitis flareup.    All of her lab work looks reassuring including normal white blood cell count of 9, normal LFTs and lipase and negative pregnancy test.    Her baseline anemia is gradually worsening and looks to be in the setting of iron deficiency and I will have her follow-up with her primary care doctor and start iron supplementation.    I am giving her some IV Toradol for pain and will follow-up on CT of the abdomen.    Patient CT scan does actually show acute uncomplicated diverticulitis of the sigmoid colon, and I will start on clear liquid diet and some pain medicine and Cipro and Flagyl antibiotic course this week and a work note.  Supportive care instructions and return precautions given.              Patient Care Considerations:          Consultants/Shared Management Plan:        Social Determinants of Health:    Patient is independent, reliable, and has access to care.       Disposition and Care Coordination:    Discharged: The patient is suitable and stable for discharge with no need for consideration of observation or admission.    I have explained the patient´s condition, diagnoses and treatment plan based on the information available to me at this time. I have answered questions and addressed any concerns. The patient has a good  understanding of the patient´s diagnosis, condition, and treatment plan as can be expected at this point. The vital signs have been stable. The patient´s condition is stable and appropriate for discharge from the emergency department.      The patient will pursue further outpatient evaluation with the primary care physician  or other designated or consulting physician as outlined in the discharge instructions. They are agreeable to this plan of care and follow-up instructions have been explained in detail. The patient has received these instructions in written format and have expressed an understanding of the discharge instructions. The patient is aware that any significant change in condition or worsening of symptoms should prompt an immediate return to this or the closest emergency department or call to 911.  I have explained discharge medications and the need for follow up with the patient/caretakers. This was also printed in the discharge instructions. Patient was discharged with the following medications and follow up:      Medication List        New Prescriptions      ciprofloxacin 500 MG tablet  Commonly known as: CIPRO  Take 1 tablet by mouth Every 12 (Twelve) Hours.     HYDROcodone-acetaminophen 5-325 MG per tablet  Commonly known as: NORCO  Take 1 tablet by mouth Every 6 (Six) Hours As Needed for Severe Pain.  Replaces: HYDROcodone-acetaminophen 7.5-325 MG per tablet     metroNIDAZOLE 500 MG tablet  Commonly known as: FLAGYL  Take 1 tablet by mouth 3 (Three) Times a Day.            Stop      HYDROcodone-acetaminophen 7.5-325 MG per tablet  Commonly known as: NORCO  Replaced by: HYDROcodone-acetaminophen 5-325 MG per tablet               Where to Get Your Medications        These medications were sent to [id number] - [place], [place] - [address] AT [address] [facility] [address]  [phone] [facility] [phone]   [address] [address]      Phone: [phone]   ciprofloxacin 500 MG tablet  HYDROcodone-acetaminophen 5-325 MG per tablet  metroNIDAZOLE 500 MG tablet      Yonis Connors APRN  [address] [place] [address] [phone]    Call in 3 days  As needed, If symptoms worsen, for a follow-up appointment       Final diagnoses:    Abdominal pain, acute, left lower quadrant   History of diverticulitis   Diverticulitis        ED Disposition       ED Disposition   Discharge    Condition   Stable    Comment   --               This medical record created using voice recognition software.             Iftikhar Feldman MD  11/28/23 [id number]

## 2023-11-28 NOTE — Clinical Note
[facility] EMERGENCY ROOM  [address]  [place] [address]  Phone: [phone]    Nikki Patel was seen and treated in our emergency department on 11/28/2023.  She may return to work on 12/01/2023.         Thank you for choosing [facility].    Iftikhar Feldman MD

## 2023-11-28 NOTE — DISCHARGE INSTRUCTIONS
Your CT scan shows that you do actually have a flareup of your diverticulitis of the colon, for which you should eat just clear liquid diet today (no food) including clear broth, Jell-O, popsicles and plenty of liquids for hydration.    In addition you should take the antibiotics all week to cover for any bacterial colon infection causing the diverticulitis and follow-up with your doctor.

## 2024-01-15 ENCOUNTER — OFFICE VISIT (OUTPATIENT)
Dept: OTOLARYNGOLOGY | Facility: CLINIC | Age: 29
End: 2024-01-15
Payer: COMMERCIAL

## 2024-01-15 VITALS — TEMPERATURE: 97.3 F | HEIGHT: 64 IN | BODY MASS INDEX: 45.21 KG/M2 | WEIGHT: 264.8 LBS

## 2024-01-15 DIAGNOSIS — J30.89 ALLERGIC RHINITIS DUE TO OTHER ALLERGIC TRIGGER, UNSPECIFIED SEASONALITY: ICD-10-CM

## 2024-01-15 DIAGNOSIS — J32.9 CHRONIC SINUSITIS, UNSPECIFIED LOCATION: Primary | ICD-10-CM

## 2024-01-15 DIAGNOSIS — Z87.891 HISTORY OF TOBACCO ABUSE: ICD-10-CM

## 2024-01-15 PROCEDURE — 1159F MED LIST DOCD IN RCRD: CPT | Performed by: OTOLARYNGOLOGY

## 2024-01-15 PROCEDURE — 31237 NSL/SINS NDSC SURG BX POLYPC: CPT | Performed by: OTOLARYNGOLOGY

## 2024-01-15 PROCEDURE — 99213 OFFICE O/P EST LOW 20 MIN: CPT | Performed by: OTOLARYNGOLOGY

## 2024-01-15 PROCEDURE — 1160F RVW MEDS BY RX/DR IN RCRD: CPT | Performed by: OTOLARYNGOLOGY

## 2024-01-15 NOTE — PATIENT INSTRUCTIONS
1.  S/p chronic sinusitis nasal cavity as well as in the left ethmoid cavity was cleaned with suction under rigid nasal endoscopy.  Patient's nasal airway is unremarkable with septum in midline.  I encouraged patient to continue the use of the saline since nasal cavity seems to be dry.  2.  Patient has significant allergic rhinitis however I gave her a copy of the testing results from the skin test and asked her to see if she can start [facility] at [facility].  3.  I will check patient again in about 2 to 3 months

## 2024-01-15 NOTE — PROGRESS NOTES
Patient Name: Nikki Patel   Visit Date: 01/15/2024   Patient ID: [id number]  Provider: Jimy Casillas MD    Sex: female  Location: [facility]   YOB: 1995  Location Address: [address] [place] [address], [address] [place] [address],?[place]?[id number]    Primary Care Provider Yonis Connors APRN  Location Phone: [phone]    Referring Provider: No ref. provider found        Chief Complaint  NASAL ENDOSCOPY FOLLOW UP    History of Present Illness  Nikki Patel is a 28 y.o. female who presents to [facility] for NASAL ENDOSCOPY FOLLOW UP.  Patient has history of deviated nasal septum with chronic nasal obstruction and chronic sinusitis.  Patient underwent septoplasty bilateral inferior turbinate submucous resection with cauterization, left nasal mass excision, endoscopic sinus surgery, magdalena bullectomy and nasal valve repair with Latera nasal implants on 7/12/2023.  All of the sinus surgery was limited to the left side.  Patient's postop course was unremarkable and pathology report revealed eosinophils noted under the microscope.  This was consistent with allergy and she underwent allergy testing on 8/24/2023.  Patient with multiple allergens tested positive including grass trees molds dust and cockroach.  However immunotherapy was held and patient is here for 3 months follow-up nasal endoscopy and examination.    Findings of surgery intraoperatively include:  1.  Severely deviated nasal septum caudally to left and posteriorly to right.  Left septum with a partially exudative area were the mucosa is raw therefore removed and sent for pathology for permanent with rest of the septal bone and cartilage.  Right side of the septal mucoperichondrium and mucoperiosteum left completely intact for blood supply.  2.  Bilateral maxillary and vomer crest spurs removed with osteotome  3.  Septal cartilage replaced back on anterior nasal spine periosteum with 3-0  prolene suture for fixation.  4.  Septal splints placed with 3-0 prolene suture after columella reconstructed to maintain strength and stability with symmetry.  Splint on the left side covered defect where mucosal excision was performed.  5.  Bilateral large inferior turbinates partially removed via submucous resection with cauterization and out fracture.  6.  Left large middle turbinate laterally and inferiorly resected and shaved then posteriorly released to prevent collapse.  7.  Navigation system with image guidance registered and verified then utilized throughout the case  8.  Extensive polypoid mucosal diseases in both middle meatus and ethmoid sinuses removed with shaver preserving skull base and lamina papyracea.   9.  Bilateral frontal recess and nasofrontal duct polypoid mucosa removed and its patency confirmed with light illumination.  10.  Left maxillary sinus with natural ostia obstructed and opened with shaver and antrostomies widened communicating with the natural ostia.  Left maxillary sinus mucosa thickened and polypoid mucosal disease removed along with mucoceles and mucopyocele.  11.  Left anterior ethmoid mucosal disease noted and removed with preservation of skull base and lamina papyracea and with assistance of navigation.    12.  Gareth powder 3 gms topically applied  13.  Nasopore dissolvable packing impregnated with bacitracin placed in left ethmoid cavities.  14.  Telfa packing placed inferiorly for hemostasis and removed following extubation without bleeding.  15.  Latera nasal implants placed on each side under the periosteum for maximal leverage to repair nasal valve collapse.  16.  Drip pad applied.  17.  Postop vision grossly intact.      Past Medical History:   Diagnosis Date    Anxiety     Autism spectrum     states she has never been diagnosed    Depression     PVC (premature ventricular contraction)     Sleep apnea        Past Surgical History:   Procedure Laterality Date     " SECTION      CHOLECYSTECTOMY      ENDOSCOPIC FUNCTIONAL SINUS SURGERY (FESS) Bilateral 2023    Procedure: SEPTOPLASTY, BILATERAL INFERIOR TURBINATE SUBMUCOUS RESECTION WITH CAUTERIZATION, LEFT NASAL MASS EXCISION, ENDOSCOPIC SINUS SURGERY, PRINCE BULLECTOMY, NASAL VALVE COLLAPSE REPAIR WITH LATERA NASAL IMPLANTS;  Surgeon: Jimy Casillas MD;  Location: [facility] OR;  Service: ENT;  Laterality: Bilateral;         Current Outpatient Medications:     DULoxetine (CYMBALTA) 30 MG capsule, Take 1 capsule by mouth once daily (Patient not taking: Reported on 1/15/2024), Disp: 30 capsule, Rfl: 0    ciprofloxacin (CIPRO) 500 MG tablet, Take 1 tablet by mouth Every 12 (Twelve) Hours., Disp: 20 tablet, Rfl: 0    HYDROcodone-acetaminophen (NORCO) 5-325 MG per tablet, Take 1 tablet by mouth Every 6 (Six) Hours As Needed for Severe Pain., Disp: 12 tablet, Rfl: 0    metroNIDAZOLE (FLAGYL) 500 MG tablet, Take 1 tablet by mouth 3 (Three) Times a Day., Disp: 21 tablet, Rfl: 0    sodium chloride (Ocean Nasal Spray) 0.65 % nasal spray, 2 sprays into the nostril(s) as directed by provider Every 1 (One) Hour While Awake. (Patient not taking: Reported on 1/15/2024), Disp: 44 mL, Rfl: 10     Allergies   Allergen Reactions    Guava Flavor Angioedema       Social History     Tobacco Use    Smoking status: Former     Types: Cigarettes     Quit date:      Years since quittin.0    Smokeless tobacco: Never   Vaping Use    Vaping Use: Every day    Substances: Nicotine    Devices: Disposable, Refillable tank   Substance Use Topics    Alcohol use: Never    Drug use: Yes     Types: Marijuana     Comment: last was yesterday        Objective     Vital Signs:   Vitals:    01/15/24 1253   Temp: 97.3 °F (36.3 °C)   TempSrc: Temporal   Weight: 120 kg (264 lb 12.8 oz)   Height: 162.6 cm (64\")       Tobacco Use: Medium Risk (1/15/2024)    Patient History     Smoking Tobacco Use: Former     Smokeless Tobacco Use: Never     Passive " Exposure: Not on file         Physical Exam    Constitutional   Appearance  well developed, well-nourished, alert and in no acute distress, voice clear and strong    Head   Inspection  no deformities or lesions      Face   Inspection  no facial lesions; House-Brackmann I/VI bilaterally   Palpation  no TMJ crepitus nor  muscle tenderness bilaterally     Eyes/Vision   Visual Fields  extraocular movements are intact, no spontaneous or gaze-induced nystagmus  Conjunctivae  clear   Sclerae  clear   Pupils and Irises  pupils equal, round, and reactive to light.   Nystagmus  not present     Ears, Nose, Mouth and Throat  Ears  External Ears  appearance within normal limits, no lesions present   Otoscopic Examination  tympanic membrane appearance within normal limits bilaterally without perforations, well-aerated middle ears   Hearing  intact to conversational voice both ears   Tunning fork testing    Rinne:  Acuña:    Nose  External Nose  appearance normal   Intranasal Exam  mucosa within normal limits, vestibules normal, no intranasal lesions present, septum midline, sinuses non tender to percussion   Modified Shannon Test:    Oral Cavity  Oral Mucosa  oral mucosa normal without pallor or cyanosis   Lips  lip appearance normal   Teeth  normal dentition for age   Gums  gums pink, non-swollen, no bleeding present   Tongue  tongue appearance normal; normal mobility   Palate  hard palate normal, soft palate appearance normal with symmetric mobility     Throat  Oropharynx  no inflammation or lesions present, tonsils within normal limits   Hypopharynx  appearance within normal limits   Larynx  voice normal     Neck  Inspection/Palpation  normal appearance, no masses or tenderness, trachea midline; thyroid size normal, nontender, no nodules or masses present on palpation     Lymphatic  Neck  no lymphadenopathy present   Supraclavicular Nodes  no lymphadenopathy present   Preauricular Nodes  no lymphadenopathy present      Respiratory  Respiratory Effort  breathing unlabored   Inspection of Chest  normal appearance, no retractions     Musculoskeletal   Cervical back: Normal range of motion and neck supple.      Skin and Subcutaneous Tissue  General Inspection  Regarding face and neck - there are no rashes present, no lesions present, and no areas of discoloration     Neurologic  Cranial Nerves  cranial nerves II-XII are grossly intact bilaterally   Gait and Station  normal gait, able to stand without diffculty    Psychiatric  Judgement and Insight  judgment and insight intact   Mood and Affect  mood normal, affect appropriate       RESULTS REVIEWED    I have reviewed the following information:   [x]  Previous Internal Note  [x]  Previous External Note:   [x]  Ordered Tests & Results:      Pathology:   Lab Results   Component Value Date    CASEREPORT  07/12/2023     Surgical Pathology Report                         Case: [id number]                                  Authorizing Provider:  Jimy Casillas MD         Collected:           07/12/2023 08:39 AM          Ordering Location:     [facility] MAIN Received:            07/13/2023 09:39 AM                                 OR                                                                           Pathologist:           Faiza Casper MD                                                     Specimens:   1) - septal bone cartilage,left septal mass,                                                        2) - right turbinate                                                                                3) - left sinus content,turbinate                                                          CLININFO  07/12/2023     DNS (deviated nasal septum)  Epistaxis  Nasal valve collapse  Chronic sinusitis  Hypertrophy of both inferior nasal turbinates  Nathalie bullosa      FINALDX  07/12/2023     1.  Septal bone, cartilage, left septal mass:   -Respiratory mucosa with erosion,  "acute and chronic inflammation, granulation tissue-like changes, and fibrosis   -Cartilage with acute and chronic inflammation   -Unremarkable bone    2.  Right turbinate:   -Respiratory mucosa with mild chronic inflammation   -Unremarkable bone    3.  Left sinus contents, turbinate:   -Respiratory mucosa with acute and chronic inflammation, granulation tissue-like changes, and fibrosis   -Unremarkable bone        GROSSDES  07/12/2023     1. U.  Received in formalin and labeled \"septal bone cartilage, left septal mass\" is a 3.5 cm aggregate of tan, hemorrhagic mucosal and cartilage fragments.  Representative sections, to include all of the soft tissue fragments, are submitted in 1 cassette following decalcification.    2. U.  Received in formalin and labeled \"right turbinate\" is a 2.5 cm tan turbinate.  Sectioning reveals a tan, slightly firm cut surface.  The specimen is entirely submitted in 1 cassette following decalcification.    3. U.  Received in formalin and labeled \"left sinus contents, turbinate\" is a 3.5 cm aggregate of tan, hemorrhagic mucosal and cartilage fragments.  Sectioning reveals a tan, slightly firm cut surface.  Representative sections are submitted in 1 cassette following decalcification. BERTHA        MICRO  07/12/2023     Microscopic examination performed.         TSH   Date Value Ref Range Status   04/11/2023 2.480 0.270 - 4.200 uIU/mL Final     Free T4   Date Value Ref Range Status   04/11/2023 1.41 0.93 - 1.70 ng/dL Final     Calcium   Date Value Ref Range Status   11/28/2023 8.7 8.6 - 10.5 mg/dL Final   07/11/2022 9.3 8.4 - 10.2 mg/dL Final       CT Abdomen Pelvis With Contrast    Result Date: 11/28/2023    1. Inflammatory change associated with mild wall thickening and diverticular disease involving the distal descending and proximal sigmoid colon compatible with acute diverticulitis.  No evidence of perforation or abscess formation noted at this time 2. New 4 mm nodule at the left lung " base likely post inflammatory or infectious in nature.  No routine follow-up recommended in this age group 3. Probable physiologic cyst right ovary.  If the patient has right-sided pelvic pain pelvic ultrasound could be considered 4. Small amount of free fluid within the pelvis which may be related to underlying diverticulitis or physiologic changes in a young female     BHUPINDER CUNNINGHAM MD       Electronically Signed and Approved By: BHUPINDER CUNNINGHAM MD on 11/28/2023 at 12:47               I have discussed the interpretation of the above results with the patient.    Nasal endoscopy with left nasal debridement    Date/Time: 1/15/2024 1:56 PM    Performed by: Jimy Casillas MD  Authorized by: Jimy Casillas MD    Consent:     Consent obtained:  Verbal    Consent given by:  Patient    Risks discussed:  Bleeding    Alternatives discussed:  No treatment and delayed treatment  Procedure details:     Medication:  Afrin and Shiva-Synephrine 1%    The nose was examined and debrided using a rigid nasal endoscopy and suction and forceps      Debridement location:  Left nasal debridement  Septum:    Findings: normal    Post-procedure details:     Patient tolerance of procedure:  Tolerated well  Comments:      Rigid nasal endoscopy was performed after topical anesthetic.  Left rigid nasal endoscopy revealed extensive crusting in the 10 left ethmoid cavity and also in the middle meatus and maxillary antrostomy site.  There is crusting and scabs were removed.  Otherwise the sinus ostia are all open especially in the frontal recess and maxillary sinus.  Ethmoid cavity is clear.  Right nasal cavity was examined and was noted to be unremarkable except some mucus and again dry crusting removed.  Patient tolerated procedure well.            Assessment and Plan   Diagnoses and all orders for this visit:    1. Chronic sinusitis, unspecified location (Primary)  -     $ Nasal / Sinus Endoscopy    2. Allergic rhinitis due to other allergic  trigger, unspecified seasonality    3. History of tobacco abuse        Nikki Patel  reports that she quit smoking about 2 years ago. Her smoking use included cigarettes. She has never used smokeless tobacco. I have educated her on the risk of diseases from using tobacco products such as Cancer, COPD & Heart Disease.          Plan:  Patient Instructions   1.  S/p chronic sinusitis nasal cavity as well as in the left ethmoid cavity was cleaned with suction under rigid nasal endoscopy.  Patient's nasal airway is unremarkable with septum in midline.  I encouraged patient to continue the use of the saline since nasal cavity seems to be dry.  2.  Patient has significant allergic rhinitis however I gave her a copy of the testing results from the skin test and asked her to see if she can [facility].  3.  I will check patient again in about 2 to 3 months     27 minutes were spent caring for Nikki on this date of service. This time spent by me includes preparing for the visit, reviewing tests, obtaining/reviewing separately obtained history, performing medically appropriate exam/evaluation, counseling/educating the patient/family/caregiver, ordering medications/tests/procedures, referring/communicating with other health care professionals, documenting information in the medical record, independently interpreting results and communicating that with the patient/family/caregiver and/or care coordination.       Follow Up   Return in about 3 months (around 4/15/2024), or Follow-up exam and nasal rigid endoscopy.  Patient was given instructions and counseling regarding her condition or for health maintenance advice. Please see specific information pulled into the AVS if appropriate.

## 2024-07-25 ENCOUNTER — TELEMEDICINE (OUTPATIENT)
Dept: FAMILY MEDICINE CLINIC | Facility: TELEHEALTH | Age: 29
End: 2024-07-25
Payer: COMMERCIAL

## 2024-07-25 DIAGNOSIS — B37.0 ORAL THRUSH: Primary | ICD-10-CM

## 2024-07-25 RX ORDER — LEVOFLOXACIN 750 MG/1
1 TABLET, FILM COATED ORAL DAILY
COMMUNITY
Start: 2024-07-07 | End: 2024-07-25

## 2024-07-25 NOTE — PROGRESS NOTES
CHIEF COMPLAINT  Chief Complaint   Patient presents with    Sore Throat         HPI  Nikki Patel is a 29 y.o. female  presents with complaint of sore throat and white and yellow patches on tongue. She has some redness of her Uvula.   Her tonsils are enlarged, but this is normal for her.   She has been on several antibiotics for diverticulitis and was hospitalized for this recently. Last dose of antibiotics was a few days ago.     Review of Systems   Constitutional:  Negative for chills, diaphoresis, fatigue and fever.   HENT:  Positive for mouth sores. Negative for congestion, dental problem and facial swelling.        Past Medical History:   Diagnosis Date    Anxiety     Autism spectrum     states she has never been diagnosed    Depression     PVC (premature ventricular contraction)     Sleep apnea        No family history on file.    Social History     Socioeconomic History    Marital status: Single   Tobacco Use    Smoking status: Former     Current packs/day: 0.00     Types: Cigarettes     Quit date:      Years since quittin.5     Passive exposure: Never    Smokeless tobacco: Never   Vaping Use    Vaping status: Every Day    Substances: Nicotine    Devices: Disposable, Refillable tank   Substance and Sexual Activity    Alcohol use: Never    Drug use: Yes     Types: Marijuana     Comment: last was yesterday    Sexual activity: Defer         Breastfeeding No     PHYSICAL EXAM  Physical Exam   Constitutional: She is oriented to person, place, and time. She appears well-developed and well-nourished. She does not have a sickly appearance. She does not appear ill. No distress.   HENT:   Head: Normocephalic and atraumatic.   Mouth/Throat: Mouth/Lips are normal.Oropharynx is clear and moist. Mucous membranes are not pale, not dry, not cyanotic and erythematous (tonsils 2+ bilaterally). Oral lesions (tongue coated and she is unable to remove coating completely. Redness below coating) present. No uvula  swelling (erythematous). No tonsillar exudate.     no white patchesnot blistered  Eyes: EOM are normal.   Neck: Neck normal appearance.  Pulmonary/Chest: Effort normal.  No respiratory distress.  Neurological: She is alert and oriented to person, place, and time.   Skin: Skin is dry.   Psychiatric: She has a normal mood and affect.           Diagnoses and all orders for this visit:    1. Oral thrush (Primary)    Other orders  -     nystatin (MYCOSTATIN) 100,000 unit/mL suspension; Swish and swallow 5 mL 4 (Four) Times a Day for 10 days.  Dispense: 240 mL; Refill: 0        The use of a video visit has been reviewed with the patient and verbal informed consent has been obtained. Myself and Nikki Patel participated in this visit. The patient is located in [address] [place] [address]. I am located in [place], [place]. Mychart and Twilio were utilized.       Note Disclaimer: At [facility], we believe that sharing information builds trust and better   relationships. You are receiving this note because you recently visited [facility]. It is possible you   will see health information before a provider has talked with you about it. This kind of information can   be easy to misunderstand. To help you fully understand what it means for your health, we urge you to   discuss this note with your provider.    OPAL Bojorquez  07/25/2024  11:00 EDT

## 2024-07-25 NOTE — PATIENT INSTRUCTIONS
Good oral hygiene. Rinse mouth and gently brush after eating.   Use Nystatin swish 3-4 times per day  If the symptoms do not improve by Monday, message me with a picture.   If they get worse, follow up       Oral Thrush, Adult  Oral thrush is an infection in your mouth and throat and on your tongue. It causes white patches to form in your mouth and on your tongue.  Many cases of thrush are mild. But, sometimes, thrush can be serious. People who have a weak body defense system (immune system) or other diseases can be affected more.  What are the causes?  This condition is caused by a type of fungus called yeast. The fungus is normally present in small amounts in the mouth and nose. If a person has a long-term illness or a weak body defense system, the fungus can grow and spread quickly. This causes thrush.  What increases the risk?  You are more likely to develop this condition if:  You have a weak body defense system.  You are an older adult.  You have diabetes, cancer, or HIV.  You have a dry mouth.  You are pregnant or breastfeeding.  You do not take good care of your teeth. This risk is greater for people who have false teeth (dentures).  You use antibiotic or steroid medicines.  What are the signs or symptoms?  Symptoms of this condition include:  A burning feeling in the mouth and throat.  White patches that stick to the mouth and tongue.  A bad taste in the mouth or trouble tasting foods.  A feeling like you have cotton in your mouth.  Pain when you eat and swallow.  Not wanting to eat as much as usual.  Cracking at the corners of the mouth.  How is this treated?  This condition is treated with medicines called antifungals. These medicines prevent a fungus from growing. The medicines are either put right on the area (topical) or swallowed (oral).  Your doctor will also treat other problems that you may have, such as diabetes or HIV.  Follow these instructions at home:  Helping with pain and soreness  To lessen  your pain:  Drink cold liquids, like water and iced tea.  Eat frozen ice pops or frozen juices.  Eat foods that are easy to swallow, like gelatin and ice cream.  Drink from a straw if you have too much pain in your mouth.     General instructions  Take or use over-the-counter and prescription medicines only as told by your doctor.  Eat plain yogurt that has live cultures in it. Read the label to make sure that there are live cultures in your yogurt.  If you wear false teeth:  Take them out before you go to bed.  Brush them well.  Soak them in a .  Rinse your mouth with warm salt-water many times a day. To make the salt-water mixture, dissolve ½-1 teaspoon (3-6 g) of salt in 1 cup (237 mL) of warm water.  Contact a doctor if:  Your problems do not get better within 7 days of treatment.  Your infection is spreading. This may show as white areas on the skin outside of your mouth.  You are nursing your baby and you have redness and pain in the nipples.  Summary  Oral thrush is an infection in your mouth and throat. It is caused by a fungus.  You are more likely to get this condition if you have a weak body defense system. Diseases like diabetes, cancer, or HIV also add to your risk.  This condition is treated with medicines called antifungals.  Contact a doctor if you do not get better within 7 days of starting treatment.  This information is not intended to replace advice given to you by your health care provider. Make sure you discuss any questions you have with your health care provider.  Document Revised: 12/04/2023 Document Reviewed: 12/04/2023  Elsevier Patient Education © 2024 [employer]